# Patient Record
Sex: FEMALE | Race: WHITE | Employment: OTHER | URBAN - METROPOLITAN AREA
[De-identification: names, ages, dates, MRNs, and addresses within clinical notes are randomized per-mention and may not be internally consistent; named-entity substitution may affect disease eponyms.]

---

## 2017-01-13 RX ORDER — ROSUVASTATIN CALCIUM 40 MG/1
40 TABLET, COATED ORAL
Qty: 30 TAB | Refills: 6 | Status: SHIPPED | OUTPATIENT
Start: 2017-01-13 | End: 2017-03-31 | Stop reason: SDUPTHER

## 2017-03-31 RX ORDER — ROSUVASTATIN CALCIUM 40 MG/1
40 TABLET, COATED ORAL
Qty: 90 TAB | Refills: 3 | Status: SHIPPED | OUTPATIENT
Start: 2017-03-31 | End: 2018-07-05 | Stop reason: SDUPTHER

## 2017-05-26 ENCOUNTER — TELEPHONE (OUTPATIENT)
Dept: CARDIOLOGY CLINIC | Age: 57
End: 2017-05-26

## 2017-05-26 NOTE — TELEPHONE ENCOUNTER
Ria Luong from [facility] called states patient is scheduled to have colonoscopy on 6/2/17 for bleeding issues and will need to hold her Plavix prior to procedure. Patient last stented 2008, she was admitted on 10/20/16 for chest pain, cath done at that time was negative per Dr. Leah Galicia note, last 440 [facility] 2014. Patient is scheduled for 1 year follow up with Dr. Gayatri Coates on  6/20/17. iKan Merino     Verbal order and read back per David Marie MD   [id number] Yessenia Mason to hold since no recent stent      -------------------------------------------------------------------------  Ria Socks office made aware verbalized understanding.   Kian Merino

## 2017-07-12 DIAGNOSIS — E78.5 DYSLIPIDEMIA: ICD-10-CM

## 2017-07-12 RX ORDER — CLOPIDOGREL BISULFATE 75 MG/1
75 TABLET ORAL DAILY
Qty: 30 TAB | Refills: 6 | Status: SHIPPED | OUTPATIENT
Start: 2017-07-12 | End: 2018-03-05 | Stop reason: SDUPTHER

## 2017-10-24 ENCOUNTER — OFFICE VISIT (OUTPATIENT)
Dept: CARDIOLOGY CLINIC | Age: 57
End: 2017-10-24

## 2017-10-24 VITALS
HEIGHT: 63 IN | SYSTOLIC BLOOD PRESSURE: 128 MMHG | HEART RATE: 74 BPM | BODY MASS INDEX: 28.17 KG/M2 | WEIGHT: 159 LBS | DIASTOLIC BLOOD PRESSURE: 82 MMHG | OXYGEN SATURATION: 98 %

## 2017-10-24 DIAGNOSIS — E78.5 DYSLIPIDEMIA: ICD-10-CM

## 2017-10-24 DIAGNOSIS — I25.10 CORONARY ARTERY DISEASE INVOLVING NATIVE CORONARY ARTERY OF NATIVE HEART WITHOUT ANGINA PECTORIS: Primary | ICD-10-CM

## 2017-10-24 DIAGNOSIS — I10 ESSENTIAL HYPERTENSION, BENIGN: ICD-10-CM

## 2017-10-24 RX ORDER — NITROGLYCERIN 0.4 MG/1
0.4 TABLET SUBLINGUAL
Qty: 25 TAB | Refills: 3 | Status: SHIPPED | OUTPATIENT
Start: 2017-10-24 | End: 2020-01-14 | Stop reason: SDUPTHER

## 2017-10-24 NOTE — PROGRESS NOTES
HISTORY OF PRESENT ILLNESS  Oral Session is a 55 y.o. female. ASSESSMENT and PLAN    Ms. Hina Melton has history of CAD. She had initial anterolateral MI back in 2001. She had LAD stent performed back in 2008 when she presented with anterior non-STEMI. Because of persistent chest pains, she underwent repeat coronary angiography in 2010 which revealed ostial ramus 90% stenosis which has done well on medical therapy. She also has dyslipidemia and hypertension. With careful dieting, she has maintained her current weight of about 155 pounds.  CAD:   Remains symptomatically stable. She has had occasional episodes of atypical chest pain for which she had been ruled out in the emergency room evaluation.  BP:   Reasonably well-controlled.  HR:    Stable.  CHF:   Currently, there is no evidence of decompensated CHF noted.  Weight:   Her weight today is 159 pounds. Her baseline weight is 155 pounds. I did discuss with her and her  about the benefits of weight loss, hopefully around 10-20 pounds.  Cholesterol:   Target LDL <70. She remains on Crestor 40 mg daily.  Anti-platelet:   Remains on ASA, and Plavix. I will see her back annually. Thank you. Encounter Diagnoses   Name Primary?  Coronary artery disease involving native coronary artery of native heart without angina pectoris Yes    Essential hypertension, benign     Dyslipidemia      current treatment plan is effective, no change in therapy  lab results and schedule of future lab studies reviewed with patient  reviewed diet, exercise and weight control  cardiovascular risk and specific lipid/LDL goals reviewed  use of aspirin to prevent MI and TIA's discussed      HPI  Today, Ms. Mohan Mohamud has no complaints of chest pains, or exertional chest pains. She denies any changes or breathing status or exercise capacity. Occasionally, in the afternoon, after taking care of her 3 toddler grandchildren, she feels fatigue.   She has not had any exertional chest pains. She denies any orthopnea or PND. She denies any palpitations or dizziness. Review of Systems   Constitutional: Positive for malaise/fatigue. Respiratory: Negative for shortness of breath. Cardiovascular: Negative for chest pain, palpitations, orthopnea, claudication, leg swelling and PND. All other systems reviewed and are negative. Physical Exam   Constitutional: She is oriented to person, place, and time. She appears well-developed and well-nourished. HENT:   Head: Normocephalic. Eyes: Conjunctivae are normal.   Neck: Neck supple. No JVD present. Carotid bruit is not present. No thyromegaly present. Cardiovascular: Normal rate and regular rhythm. No murmur heard. Pulmonary/Chest: Breath sounds normal.   Abdominal: Bowel sounds are normal.   Musculoskeletal: She exhibits no edema. Neurological: She is alert and oriented to person, place, and time. Skin: Skin is warm and dry. Nursing note and vitals reviewed. PCP: Bo Tsang MD    Past Medical History:   Diagnosis Date    Abnormal nuclear cardiac imaging test 07/16/2014    Sm-mod apical infarction. No ischemia. EF 60%. Mod apical, apical septal hypk. Neg EKG on max EST. Ex time 6 min.  CAD (coronary artery disease), native coronary artery     Dyslipidemia     Echocardiogram 09/08/2010    EF 50-55%. Mild, diffuse hypk. Mild mid-apical , anterior, apical septal hypk.  Essential hypertension, benign     Headache(784.0)     migraine    History of tobacco use     none since 2001    Hypercholesterolemia     Lower extremity venous duplex 10/05/2011    No evidence of DVT or superficial thrombosis bilaterally.  MI (myocardial infarction) 2001; 3/2008    s/p anterior wall MI, 2001; non-ST elevation MI, 2008    Musculoskeletal pain     Other chest pain     S/P cardiac cath 09/10/2010    RI (sm) 90% ostial.  LAD 30% in-stent restenosis. Otherwise patent coronaries.  LVEDP 19.  EF 60%.  Thyroid disease        Past Surgical History:   Procedure Laterality Date    CARDIAC CATHETERIZATION  10/20/2016         CARDIAC SURG PROCEDURE UNLIST      stent placement     CORONARY ARTERY ANGIOGRAM  10/20/2016         HX APPENDECTOMY      1999    HX CHOLECYSTECTOMY      2005    HX CORONARY STENT PLACEMENT  2001; 2003; 2008    HX GYN      complete hysterectomy 1996    LV ANGIOGRAPHY  10/20/2016            Current Outpatient Prescriptions   Medication Sig Dispense Refill    nitroglycerin (NITROSTAT) 0.4 mg SL tablet 1 Tab by SubLINGual route every five (5) minutes as needed for Chest Pain. 25 Tab 3    clopidogrel (PLAVIX) 75 mg tab Take 1 Tab by mouth daily. 30 Tab 6    rosuvastatin (CRESTOR) 40 mg tablet Take 1 Tab by mouth nightly. 90 Tab 3    famotidine (PEPCID) 40 mg tablet Take 1 Tab by mouth daily. 20 Tab 0    HYDROmorphone (DILAUDID) 2 mg tablet Take 1 Tab by mouth every four (4) hours as needed. Max Daily Amount: 12 mg. 20 Tab 0    aspirin delayed-release 81 mg tablet Take 1 Tab by mouth daily. 1 Tab 0    levothyroxine (SYNTHROID) 75 mcg tablet Take 75 mcg by mouth Daily (before breakfast).  butalbital-acetaminophen-caffeine -40 mg lollipop 400 mcg by Buccal route three (3) times daily.  BUTORPHANOL TARTRATE IJ by Injection route as needed. 10 mg/ml QD      zolpidem (AMBIEN) 10 mg tablet Take 10 mg by mouth nightly as needed.  lisinopril (PRINIVIL, ZESTRIL) 10 mg tablet Take 10 mg by mouth daily.          The patient has a family history of    Social History   Substance Use Topics    Smoking status: Former Smoker     Packs/day: 1.00     Years: 10.00     Quit date: 10/5/2000    Smokeless tobacco: Never Used    Alcohol use 0.6 oz/week     1 Glasses of wine per week      Comment: occasional alcohol use       Lab Results   Component Value Date/Time    Cholesterol, total 146 10/06/2011 01:15 AM    HDL Cholesterol 64 10/06/2011 01:15 AM    LDL, calculated 58.4 10/06/2011 01:15 AM    Triglyceride 118 10/06/2011 01:15 AM    CHOL/HDL Ratio 2.3 10/06/2011 01:15 AM        BP Readings from Last 3 Encounters:   10/24/17 128/82   10/20/16 130/77   06/20/16 126/78        Pulse Readings from Last 3 Encounters:   10/24/17 74   10/20/16 71   06/20/16 78       Wt Readings from Last 3 Encounters:   10/24/17 72.1 kg (159 lb)   10/20/16 68.9 kg (152 lb)   06/20/16 71.7 kg (158 lb)         EKG: unchanged from previous tracings, normal sinus rhythm, low precordial R-wave voltage.

## 2017-10-24 NOTE — PROGRESS NOTES
1. Have you been to the ER, urgent care clinic since your last visit? Hospitalized since your last visit? Yes, 10/20/16 - 10/21/16 for chest pain     2. Have you seen or consulted any other health care providers outside of the [address] [place] [address] since your last visit? Include any pap smears or colon screening.   No

## 2017-10-24 NOTE — MR AVS SNAPSHOT
Visit Information Date & Time Provider Department Dept. Phone Encounter #  
 10/24/2017  4:00 PM Avril Lo MD [facility] [address] [id number] Upcoming Health Maintenance Date Due Hepatitis C Screening 1960 DTaP/Tdap/Td series (1 - Tdap) 12/29/1982 PAP AKA CERVICAL CYTOLOGY 12/29/1982 FOBT Q 1 YEAR AGE 50-75 12/29/2011 INFLUENZA AGE 9 TO ADULT 8/1/2017 BREAST CANCER SCRN MAMMOGRAM 2/11/2019 Allergies as of 10/24/2017  Review Complete On: 10/20/2016 By: Stella Crocker RN Severity Noted Reaction Type Reactions Nitroglycerin    Other (comments) Could not tolerate long-acting nitroglycerin due to her migraine headaches. Toprol Xl [Metoprolol Succinate]    Other (comments) Could not tolerate b/c of her migraine headaches Current Immunizations  Never Reviewed No immunizations on file. Not reviewed this visit You Were Diagnosed With   
  
 Codes Comments Coronary artery disease involving native coronary artery of native heart without angina pectoris    -  Primary ICD-10-CM: I25.10 ICD-9-CM: 414.01 Essential hypertension, benign     ICD-10-CM: I10 
ICD-9-CM: 401.1 Dyslipidemia     ICD-10-CM: E78.5 ICD-9-CM: 272.4 Vitals BP Pulse Height(growth percentile) Weight(growth percentile) SpO2 BMI  
 128/82 74 5' 3\" (1.6 m) 159 lb (72.1 kg) 98% 28.17 kg/m2 Smoking Status Former Smoker Vitals History BMI and BSA Data Body Mass Index Body Surface Area  
 28.17 kg/m 2 1.79 m 2 Preferred Pharmacy Pharmacy Name Phone [facility] [phone], [address] [address] [phone] Your Updated Medication List  
  
   
This list is accurate as of: 10/24/17  4:25 PM.  Always use your most recent med list.  
  
  
  
  
 aspirin delayed-release 81 mg tablet Take 1 Tab by mouth daily. butalbital-acetaminophen-caffeine -40 mg lollipop 400 mcg by Buccal route three (3) times daily. BUTORPHANOL TARTRATE INJECTION  
by Injection route as needed. 10 mg/ml QD  
  
 clopidogrel 75 mg Tab Commonly known as:  PLAVIX Take 1 Tab by mouth daily. famotidine 40 mg tablet Commonly known as:  PEPCID Take 1 Tab by mouth daily. HYDROmorphone 2 mg tablet Commonly known as:  DILAUDID Take 1 Tab by mouth every four (4) hours as needed. Max Daily Amount: 12 mg.  
  
 levothyroxine 75 mcg tablet Commonly known as:  SYNTHROID Take 75 mcg by mouth Daily (before breakfast). lisinopril 10 mg tablet Commonly known as:  Aundra Raul Take 10 mg by mouth daily. nitroglycerin 0.4 mg SL tablet Commonly known as:  NITROSTAT  
1 Tab by SubLINGual route every five (5) minutes as needed for Chest Pain. rosuvastatin 40 mg tablet Commonly known as:  CRESTOR Take 1 Tab by mouth nightly. zolpidem 10 mg tablet Commonly known as:  AMBIEN Take 10 mg by mouth nightly as needed. We Performed the Following AMB POC EKG ROUTINE W/ 12 LEADS, INTER & REP [87033 CPT(R)] Introducing [facility] SERVICES! Dear Soledad Leach: Thank you for requesting a [employer] account. Our records indicate that you have previously registered for a [employer] account but its currently inactive. Please call our S[employer] support line at [phone]. Additional Information If you have questions, please visit the Frequently Asked Questions section of the [employer] website at [email or web address] Remember, [employer] is NOT to be used for urgent needs. For medical emergencies, dial 911. Now available from your iPhone and Android! Please provide this summary of care documentation to your next provider. Your primary care clinician is listed as Olena Fisher.  If you have any questions after today's visit, please call [phone].

## 2018-03-05 DIAGNOSIS — E78.5 DYSLIPIDEMIA: ICD-10-CM

## 2018-03-05 RX ORDER — CLOPIDOGREL BISULFATE 75 MG/1
75 TABLET ORAL DAILY
Qty: 30 TAB | Refills: 6 | Status: SHIPPED | OUTPATIENT
Start: 2018-03-05 | End: 2018-11-16 | Stop reason: SDUPTHER

## 2018-07-05 RX ORDER — ROSUVASTATIN CALCIUM 40 MG/1
40 TABLET, COATED ORAL
Qty: 90 TAB | Refills: 3 | Status: SHIPPED | OUTPATIENT
Start: 2018-07-05 | End: 2019-08-05 | Stop reason: SDUPTHER

## 2018-11-16 DIAGNOSIS — E78.5 DYSLIPIDEMIA: ICD-10-CM

## 2018-11-16 RX ORDER — CLOPIDOGREL BISULFATE 75 MG/1
75 TABLET ORAL DAILY
Qty: 90 TAB | Refills: 3 | Status: SHIPPED | OUTPATIENT
Start: 2018-11-16 | End: 2019-12-10 | Stop reason: SDUPTHER

## 2019-02-12 ENCOUNTER — OFFICE VISIT (OUTPATIENT)
Dept: CARDIOLOGY CLINIC | Age: 59
End: 2019-02-12

## 2019-02-12 VITALS
DIASTOLIC BLOOD PRESSURE: 68 MMHG | HEIGHT: 63 IN | BODY MASS INDEX: 28.88 KG/M2 | WEIGHT: 163 LBS | OXYGEN SATURATION: 98 % | SYSTOLIC BLOOD PRESSURE: 130 MMHG | HEART RATE: 74 BPM

## 2019-02-12 DIAGNOSIS — R07.9 CHEST PAIN, UNSPECIFIED TYPE: ICD-10-CM

## 2019-02-12 DIAGNOSIS — I25.10 CORONARY ARTERY DISEASE INVOLVING NATIVE CORONARY ARTERY OF NATIVE HEART WITHOUT ANGINA PECTORIS: Primary | ICD-10-CM

## 2019-02-12 NOTE — PROGRESS NOTES
HISTORY OF PRESENT ILLNESS  Ugo Sampson is a 63 y.o. female. ASSESSMENT and PLAN    Ms. Kendra Whitlock has history of CAD. She had initial anterolateral MI back in 2001. She had LAD stent performed back in 2008 when she presented with anterior non-STEMI. Because of persistent chest pains, she underwent repeat coronary angiography in 2010 which revealed ostial ramus 90% stenosis which has done well on medical therapy. She also has dyslipidemia and hypertension. With careful dieting, she is trying to maintain her weight at about 155 pounds.  CAD:   Likely stable.  BP:   Mildly elevated. Again, encouraged weight control.  HR:    Stable.  CHF:   There is no evidence of decompensated CHF noted.  Weight:   Her weight today is 163 pounds. Her baseline weight is 155 pounds. As noted above, I did encourage her to try to go back to 155 pounds. I feel that her blood pressure will be better controlled with weight loss.  Cholesterol:   Target LDL <70. Crestor 40.  Anti-platelet:   Remains on ASA, and Plavix. I will see her back annually. Thank you. Encounter Diagnoses   Name Primary?  Coronary artery disease involving native coronary artery of native heart without angina pectoris Yes    Chest pain, unspecified type      current treatment plan is effective, no change in therapy  lab results and schedule of future lab studies reviewed with patient  reviewed diet, exercise and weight control  cardiovascular risk and specific lipid/LDL goals reviewed  use of aspirin to prevent MI and TIA's discussed        HPI  Today, Ms. Min Lawrence has no complaints of chest pains, or increased dyspnea on exertion. She denies any changes in her exercise capacity. She takes care of 4 grandchildren and remains active by doing this. She denies any significant limitations. She denies any recent changes. She denies any orthopnea or PND. She denies any palpitations or dizziness.   She is accompanied by her . Review of Systems   Respiratory: Negative for shortness of breath. Cardiovascular: Negative for chest pain, palpitations, orthopnea, claudication, leg swelling and PND. All other systems reviewed and are negative. Physical Exam   Constitutional: She is oriented to person, place, and time. She appears well-developed and well-nourished. HENT:   Head: Normocephalic. Eyes: Conjunctivae are normal.   Neck: Neck supple. No JVD present. Carotid bruit is not present. No thyromegaly present. Cardiovascular: Normal rate and regular rhythm. Pulmonary/Chest: Breath sounds normal.   Abdominal: Bowel sounds are normal.   Musculoskeletal: She exhibits no edema. Neurological: She is alert and oriented to person, place, and time. Skin: Skin is warm and dry. Nursing note and vitals reviewed. PCP: Lory Ramos MD    Past Medical History:   Diagnosis Date    Abnormal nuclear cardiac imaging test 07/16/2014    Sm-mod apical infarction. No ischemia. EF 60%. Mod apical, apical septal hypk. Neg EKG on max EST. Ex time 6 min.  CAD (coronary artery disease), native coronary artery     Dyslipidemia     Echocardiogram 09/08/2010    EF 50-55%. Mild, diffuse hypk. Mild mid-apical , anterior, apical septal hypk.  Essential hypertension, benign     Headache(784.0)     migraine    History of tobacco use     none since 2001    Hypercholesterolemia     Lower extremity venous duplex 10/05/2011    No evidence of DVT or superficial thrombosis bilaterally.  MI (myocardial infarction) ([facility] [address] 2001; 3/2008    s/p anterior wall MI, 2001; non-ST elevation MI, 2008    Musculoskeletal pain     Other chest pain     S/P cardiac cath 09/10/2010    RI (sm) 90% ostial.  LAD 30% in-stent restenosis. Otherwise patent coronaries. LVEDP 19. EF 60%.       Thyroid disease        Past Surgical History:   Procedure Laterality Date    CARDIAC CATHETERIZATION  10/20/2016        [facility] CARDIAC SURG PROCEDURE UNLIST      stent placement     CORONARY ARTERY ANGIOGRAM  10/20/2016         HX APPENDECTOMY          HX CHOLECYSTECTOMY      2005    HX CORONARY STENT PLACEMENT  ; ;     HX GYN      complete hysterectomy     LV ANGIOGRAPHY  10/20/2016            Current Outpatient Medications   Medication Sig Dispense Refill    clopidogrel (PLAVIX) 75 mg tab Take 1 Tab by mouth daily. 90 Tab 3    rosuvastatin (CRESTOR) 40 mg tablet Take 1 Tab by mouth nightly. 90 Tab 3    nitroglycerin (NITROSTAT) 0.4 mg SL tablet 1 Tab by SubLINGual route every five (5) minutes as needed for Chest Pain. 25 Tab 3    famotidine (PEPCID) 40 mg tablet Take 1 Tab by mouth daily. 20 Tab 0    HYDROmorphone (DILAUDID) 2 mg tablet Take 1 Tab by mouth every four (4) hours as needed. Max Daily Amount: 12 mg. 20 Tab 0    aspirin delayed-release 81 mg tablet Take 1 Tab by mouth daily. 1 Tab 0    levothyroxine (SYNTHROID) 75 mcg tablet Take 75 mcg by mouth Daily (before breakfast).  butalbital-acetaminophen-caffeine -40 mg lollipop 400 mcg by Buccal route three (3) times daily.  BUTORPHANOL TARTRATE IJ by Injection route as needed. 10 mg/ml QD      zolpidem (AMBIEN) 10 mg tablet Take 10 mg by mouth nightly as needed.  lisinopril (PRINIVIL, ZESTRIL) 10 mg tablet Take 10 mg by mouth daily. The patient has a family history of    Social History     Tobacco Use    Smoking status: Former Smoker     Packs/day: 1.00     Years: 10.00     Pack years: 10.00     Last attempt to quit: 10/5/2000     Years since quittin.3    Smokeless tobacco: Never Used   Substance Use Topics    Alcohol use:  Yes     Alcohol/week: 0.6 oz     Types: 1 Glasses of wine per week     Comment: occasional alcohol use    Drug use: No       Lab Results   Component Value Date/Time    Cholesterol, total 146 10/06/2011 01:15 AM    HDL Cholesterol 64 (H) 10/06/2011 01:15 AM    LDL, calculated 58.4 10/06/2011 01:15 AM Triglyceride 118 10/06/2011 01:15 AM    CHOL/HDL Ratio 2.3 10/06/2011 01:15 AM        BP Readings from Last 3 Encounters:   02/12/19 130/68   10/24/17 128/82   10/20/16 130/77        Pulse Readings from Last 3 Encounters:   02/12/19 74   10/24/17 74   10/20/16 71       Wt Readings from Last 3 Encounters:   02/12/19 73.9 kg (163 lb)   10/24/17 72.1 kg (159 lb)   10/20/16 68.9 kg (152 lb)         EKG: unchanged from previous tracings, Q waves in V1 through V3 with low precordial R wave voltage.

## 2019-03-15 ENCOUNTER — TELEPHONE (OUTPATIENT)
Dept: CARDIOLOGY CLINIC | Age: 59
End: 2019-03-15

## 2019-03-15 NOTE — TELEPHONE ENCOUNTER
Patient called regarding a clearance she needs. 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 Isabella Okeefe and needs to come off her Plavix x5 days. Patient states that she didn't take her Plavix today. Verbal order and read back per Oziel Peguero MD  Patient is cleared and low risk for tumor removal and can come off Plavix for 5 days.

## 2019-08-05 RX ORDER — ROSUVASTATIN CALCIUM 40 MG/1
40 TABLET, COATED ORAL
Qty: 90 TAB | Refills: 3 | Status: SHIPPED | OUTPATIENT
Start: 2019-08-05 | End: 2020-08-05

## 2019-12-10 DIAGNOSIS — E78.5 DYSLIPIDEMIA: ICD-10-CM

## 2019-12-10 RX ORDER — CLOPIDOGREL BISULFATE 75 MG/1
75 TABLET ORAL DAILY
Qty: 90 TAB | Refills: 3 | Status: SHIPPED | OUTPATIENT
Start: 2019-12-10 | End: 2020-12-09 | Stop reason: SDUPTHER

## 2020-01-11 ENCOUNTER — DOCUMENTATION ONLY (OUTPATIENT)
Dept: CARDIOLOGY CLINIC | Age: 60
End: 2020-01-11

## 2020-01-11 ENCOUNTER — TELEPHONE (OUTPATIENT)
Dept: CARDIOLOGY CLINIC | Age: 60
End: 2020-01-11

## 2020-01-11 ENCOUNTER — HOSPITAL ENCOUNTER (EMERGENCY)
Dept: CT IMAGING | Age: 60
Discharge: HOME | End: 2020-01-11
Attending: EMERGENCY MEDICINE
Payer: MEDICARE

## 2020-01-11 ENCOUNTER — HOSPITAL ENCOUNTER (EMERGENCY)
Age: 60
Discharge: HOME | End: 2020-01-11
Attending: EMERGENCY MEDICINE
Payer: MEDICARE

## 2020-01-11 ENCOUNTER — APPOINTMENT (OUTPATIENT)
Dept: GENERAL RADIOLOGY | Age: 60
End: 2020-01-11
Attending: EMERGENCY MEDICINE
Payer: MEDICARE

## 2020-01-11 VITALS
BODY MASS INDEX: 27.55 KG/M2 | SYSTOLIC BLOOD PRESSURE: 144 MMHG | OXYGEN SATURATION: 97 % | DIASTOLIC BLOOD PRESSURE: 82 MMHG | HEART RATE: 94 BPM | WEIGHT: 161.4 LBS | TEMPERATURE: 97 F | HEIGHT: 64 IN | RESPIRATION RATE: 14 BRPM

## 2020-01-11 DIAGNOSIS — Z95.5 HISTORY OF CORONARY ARTERY STENT PLACEMENT: ICD-10-CM

## 2020-01-11 DIAGNOSIS — Z86.79 HISTORY OF CHRONIC HYPERTENSION: ICD-10-CM

## 2020-01-11 DIAGNOSIS — Z86.79 HISTORY OF CORONARY ARTERY DISEASE: ICD-10-CM

## 2020-01-11 DIAGNOSIS — R07.89 ATYPICAL CHEST PAIN: Primary | ICD-10-CM

## 2020-01-11 LAB
ALBUMIN SERPL-MCNC: 3.9 G/DL (ref 3.4–5)
ALBUMIN/GLOB SERPL: 1.1 {RATIO} (ref 0.8–1.7)
ALP SERPL-CCNC: 142 U/L (ref 45–117)
ALT SERPL-CCNC: 42 U/L (ref 13–56)
ANION GAP SERPL CALC-SCNC: 4 MMOL/L (ref 3–18)
AST SERPL-CCNC: 29 U/L (ref 10–38)
BASOPHILS # BLD: 0 K/UL (ref 0–0.1)
BASOPHILS NFR BLD: 0 % (ref 0–2)
BILIRUB SERPL-MCNC: 0.2 MG/DL (ref 0.2–1)
BNP SERPL-MCNC: 124 PG/ML (ref 0–900)
BUN SERPL-MCNC: 18 MG/DL (ref 7–18)
BUN/CREAT SERPL: 22 (ref 12–20)
CALCIUM SERPL-MCNC: 9.3 MG/DL (ref 8.5–10.1)
CHLORIDE SERPL-SCNC: 109 MMOL/L (ref 100–111)
CK MB CFR SERPL CALC: NORMAL % (ref 0–4)
CK MB CFR SERPL CALC: NORMAL % (ref 0–4)
CK MB SERPL-MCNC: <1 NG/ML (ref 5–25)
CK MB SERPL-MCNC: <1 NG/ML (ref 5–25)
CK SERPL-CCNC: 64 U/L (ref 26–192)
CK SERPL-CCNC: 79 U/L (ref 26–192)
CO2 SERPL-SCNC: 27 MMOL/L (ref 21–32)
CREAT SERPL-MCNC: 0.83 MG/DL (ref 0.6–1.3)
DIFFERENTIAL METHOD BLD: NORMAL
EOSINOPHIL # BLD: 0.2 K/UL (ref 0–0.4)
EOSINOPHIL NFR BLD: 3 % (ref 0–5)
ERYTHROCYTE [DISTWIDTH] IN BLOOD BY AUTOMATED COUNT: 13.2 % (ref 11.6–14.5)
GLOBULIN SER CALC-MCNC: 3.5 G/DL (ref 2–4)
GLUCOSE SERPL-MCNC: 107 MG/DL (ref 74–99)
HCT VFR BLD AUTO: 40.1 % (ref 35–45)
HGB BLD-MCNC: 13.2 G/DL (ref 12–16)
LYMPHOCYTES # BLD: 2 K/UL (ref 0.9–3.6)
LYMPHOCYTES NFR BLD: 24 % (ref 21–52)
MCH RBC QN AUTO: 29.5 PG (ref 24–34)
MCHC RBC AUTO-ENTMCNC: 32.9 G/DL (ref 31–37)
MCV RBC AUTO: 89.7 FL (ref 74–97)
MONOCYTES # BLD: 0.8 K/UL (ref 0.05–1.2)
MONOCYTES NFR BLD: 10 % (ref 3–10)
NEUTS SEG # BLD: 5.5 K/UL (ref 1.8–8)
NEUTS SEG NFR BLD: 63 % (ref 40–73)
PLATELET # BLD AUTO: 261 K/UL (ref 135–420)
PMV BLD AUTO: 10.4 FL (ref 9.2–11.8)
POTASSIUM SERPL-SCNC: 4.3 MMOL/L (ref 3.5–5.5)
PROT SERPL-MCNC: 7.4 G/DL (ref 6.4–8.2)
RBC # BLD AUTO: 4.47 M/UL (ref 4.2–5.3)
SODIUM SERPL-SCNC: 140 MMOL/L (ref 136–145)
TROPONIN I SERPL-MCNC: <0.02 NG/ML (ref 0–0.04)
WBC # BLD AUTO: 8.5 K/UL (ref 4.6–13.2)

## 2020-01-11 PROCEDURE — 96374 THER/PROPH/DIAG INJ IV PUSH: CPT

## 2020-01-11 PROCEDURE — 82550 ASSAY OF CK (CPK): CPT

## 2020-01-11 PROCEDURE — 74174 CTA ABD&PLVS W/CONTRAST: CPT

## 2020-01-11 PROCEDURE — 85025 COMPLETE CBC W/AUTO DIFF WBC: CPT

## 2020-01-11 PROCEDURE — 74011250637 HC RX REV CODE- 250/637: Performed by: STUDENT IN AN ORGANIZED HEALTH CARE EDUCATION/TRAINING PROGRAM

## 2020-01-11 PROCEDURE — 71045 X-RAY EXAM CHEST 1 VIEW: CPT

## 2020-01-11 PROCEDURE — 74011250636 HC RX REV CODE- 250/636: Performed by: STUDENT IN AN ORGANIZED HEALTH CARE EDUCATION/TRAINING PROGRAM

## 2020-01-11 PROCEDURE — 80053 COMPREHEN METABOLIC PANEL: CPT

## 2020-01-11 PROCEDURE — 74011636320 HC RX REV CODE- 636/320: Performed by: EMERGENCY MEDICINE

## 2020-01-11 PROCEDURE — 93005 ELECTROCARDIOGRAM TRACING: CPT

## 2020-01-11 PROCEDURE — 74011250636 HC RX REV CODE- 250/636: Performed by: EMERGENCY MEDICINE

## 2020-01-11 PROCEDURE — 83880 ASSAY OF NATRIURETIC PEPTIDE: CPT

## 2020-01-11 PROCEDURE — 99284 EMERGENCY DEPT VISIT MOD MDM: CPT

## 2020-01-11 PROCEDURE — 96375 TX/PRO/DX INJ NEW DRUG ADDON: CPT

## 2020-01-11 RX ORDER — GUAIFENESIN 100 MG/5ML
LIQUID (ML) ORAL
Status: DISPENSED
Start: 2020-01-11 | End: 2020-01-12

## 2020-01-11 RX ORDER — FAMOTIDINE 10 MG/ML
INJECTION INTRAVENOUS
Status: DISPENSED
Start: 2020-01-11 | End: 2020-01-12

## 2020-01-11 RX ORDER — GUAIFENESIN 100 MG/5ML
81 LIQUID (ML) ORAL
Status: COMPLETED | OUTPATIENT
Start: 2020-01-11 | End: 2020-01-11

## 2020-01-11 RX ORDER — MORPHINE SULFATE 4 MG/ML
INJECTION INTRAVENOUS
Status: DISCONTINUED
Start: 2020-01-11 | End: 2020-01-11 | Stop reason: HOSPADM

## 2020-01-11 RX ORDER — FAMOTIDINE 10 MG/ML
20 INJECTION INTRAVENOUS
Status: DISCONTINUED | OUTPATIENT
Start: 2020-01-11 | End: 2020-01-11

## 2020-01-11 RX ORDER — NITROGLYCERIN 0.4 MG/1
TABLET SUBLINGUAL
Status: DISCONTINUED
Start: 2020-01-11 | End: 2020-01-12 | Stop reason: WASHOUT

## 2020-01-11 RX ORDER — ACETAMINOPHEN 325 MG/1
TABLET ORAL
Status: DISPENSED
Start: 2020-01-11 | End: 2020-01-12

## 2020-01-11 RX ORDER — FENTANYL CITRATE 50 UG/ML
INJECTION, SOLUTION INTRAMUSCULAR; INTRAVENOUS
Status: DISCONTINUED
Start: 2020-01-11 | End: 2020-01-11 | Stop reason: HOSPADM

## 2020-01-11 RX ORDER — FAMOTIDINE 10 MG/ML
20 INJECTION INTRAVENOUS
Status: COMPLETED | OUTPATIENT
Start: 2020-01-11 | End: 2020-01-11

## 2020-01-11 RX ORDER — NITROGLYCERIN 0.4 MG/1
0.4 TABLET SUBLINGUAL
Status: COMPLETED | OUTPATIENT
Start: 2020-01-11 | End: 2020-01-11

## 2020-01-11 RX ORDER — FENTANYL CITRATE 50 UG/ML
50 INJECTION, SOLUTION INTRAMUSCULAR; INTRAVENOUS
Status: COMPLETED | OUTPATIENT
Start: 2020-01-11 | End: 2020-01-11

## 2020-01-11 RX ORDER — ACETAMINOPHEN 325 MG/1
975 TABLET ORAL ONCE
Status: COMPLETED | OUTPATIENT
Start: 2020-01-11 | End: 2020-01-11

## 2020-01-11 RX ORDER — ONDANSETRON 2 MG/ML
4 INJECTION INTRAMUSCULAR; INTRAVENOUS
Status: COMPLETED | OUTPATIENT
Start: 2020-01-11 | End: 2020-01-11

## 2020-01-11 RX ORDER — MORPHINE SULFATE 2 MG/ML
4 INJECTION, SOLUTION INTRAMUSCULAR; INTRAVENOUS ONCE
Status: COMPLETED | OUTPATIENT
Start: 2020-01-11 | End: 2020-01-11

## 2020-01-11 RX ORDER — ONDANSETRON 2 MG/ML
INJECTION INTRAMUSCULAR; INTRAVENOUS
Status: DISCONTINUED
Start: 2020-01-11 | End: 2020-01-11 | Stop reason: HOSPADM

## 2020-01-11 RX ADMIN — NITROGLYCERIN 0.4 MG: 0.4 TABLET SUBLINGUAL at 12:31

## 2020-01-11 RX ADMIN — MORPHINE SULFATE 4 MG: 2 INJECTION, SOLUTION INTRAMUSCULAR; INTRAVENOUS at 13:08

## 2020-01-11 RX ADMIN — ASPIRIN 81 MG 81 MG: 81 TABLET ORAL at 12:24

## 2020-01-11 RX ADMIN — IOPAMIDOL 100 ML: 755 INJECTION, SOLUTION INTRAVENOUS at 14:33

## 2020-01-11 RX ADMIN — ONDANSETRON 4 MG: 2 INJECTION INTRAMUSCULAR; INTRAVENOUS at 13:25

## 2020-01-11 RX ADMIN — NITROGLYCERIN 0.4 MG: 0.4 TABLET SUBLINGUAL at 12:36

## 2020-01-11 RX ADMIN — ACETAMINOPHEN 975 MG: 325 TABLET ORAL at 12:24

## 2020-01-11 RX ADMIN — NITROGLYCERIN 0.4 MG: 0.4 TABLET SUBLINGUAL at 12:25

## 2020-01-11 RX ADMIN — FENTANYL CITRATE 50 MCG: 50 INJECTION, SOLUTION INTRAMUSCULAR; INTRAVENOUS at 15:29

## 2020-01-11 RX ADMIN — FAMOTIDINE 20 MG: 10 INJECTION, SOLUTION INTRAVENOUS at 14:39

## 2020-01-11 NOTE — PROGRESS NOTES
I saw this patient in the emergency room on the afternoon of January 11, 2019 I did talk to the patient and examined her as well as reviewing her hospital records. She unfortunately was discharged prior to my full consultation dictation which is the reason I am making this notation. When I saw her she was complaining of severe substernal chest discomfort which was rated 6 or 7/10 in severity. This discomfort had been constant for more than 24 hours and was initially in the 5/10 range. She had serial EKGs which demonstrated nothing acute showing an old inferior MI and old anteroseptal MI, but no ST changes. She had 3 troponins which were less than 0.02 and she had a CT of the chest to rule out aortic dissection with a findings as indicated below:    1. No dissection, intramural hematoma or pulmonary embolus. 2. Focal outpouching at the expected previous location of ductus arteriosus. 3. Cholecystectomy, hysterectomy and appendectomy. 4. Atherosclerosis. There is some narrowing of the renal artery origins bilaterally, left greater than right. 5. Status post LAD stent placement with left ventricular findings of previous myocardial ischemia. 6. Please see report of multiple additional findings and further details. She was treated for gastroesophageal reflux disease per the ER physician Dr. Neal Duong and was feeling somewhat better though was still having chest discomfort, but insisted on being discharged home. I suspect she will call for an appointment with Dr. Jovan Merlos on Monday and wanted to document her ER visit and that I had seen her since she left before I could complete the consultation.  ES

## 2020-01-11 NOTE — TELEPHONE ENCOUNTER
Patient called to tell me that she has been having chest pain on and off for 2 weeks and she has had constant chest pain since yesterday. It is not terribly severe but she would rated 5/10 in severity. There is a mild pleuritic accentuation and some accentuation with body movement so this may be noncardiac,. However, this lady has known coronary artery disease and so I recommended that she go to an emergency room to get an EKG and cardiac enzymes to be sure she is not having an acute coronary event. Since the pain is been constant since last night and has some pleuritic component and accentuation with body movement I think it is reasonable to go by car. She will either go to [facility] emergency room which she is closest to since she lives in [facility] or possibly come [facility] for an EKG and cardiac enzymes.  ES

## 2020-01-11 NOTE — ED PROVIDER NOTES
EMERGENCY DEPARTMENT HISTORY AND PHYSICAL EXAM    12:25 PM      Date: 1/11/2020  Patient Name: Baldemar Mcclain    History of Presenting Illness     Chief Complaint   Patient presents with    Chest Pain         History Provided By: Patient  Location/Duration/Severity/Modifying factors   Baldemar Mcclain is a 63 y.o. female with a history of coronary artery disease with stents x7, hypertension, and hypercholesterolemia presenting for chest pain that was constant since yesterday afternoon. \"Feels like her prior heart attack\". Took 81 mg aspirin x3 prior to arrival.  Reports that she was having intermittent pain for several days prior, but pain became constant and more severe yesterday. She denies any exertional symptoms. No associated shortness of breath or diaphoresis. Mild nausea, but no vomiting or diarrhea. No abdominal pain. Patient reports compliance with her medications. PCP: Leticia Weston MD    Current Facility-Administered Medications   Medication Dose Route Frequency Provider Last Rate Last Dose    morphine 4 mg/mL injection              Current Outpatient Medications   Medication Sig Dispense Refill    clopidogrel (PLAVIX) 75 mg tab Take 1 Tab by mouth daily. 90 Tab 3    rosuvastatin (CRESTOR) 40 mg tablet Take 1 Tab by mouth nightly. 90 Tab 3    nitroglycerin (NITROSTAT) 0.4 mg SL tablet 1 Tab by SubLINGual route every five (5) minutes as needed for Chest Pain. 25 Tab 3    famotidine (PEPCID) 40 mg tablet Take 1 Tab by mouth daily. 20 Tab 0    HYDROmorphone (DILAUDID) 2 mg tablet Take 1 Tab by mouth every four (4) hours as needed. Max Daily Amount: 12 mg. 20 Tab 0    aspirin delayed-release 81 mg tablet Take 1 Tab by mouth daily. 1 Tab 0    levothyroxine (SYNTHROID) 75 mcg tablet Take 75 mcg by mouth Daily (before breakfast).  butalbital-acetaminophen-caffeine -40 mg lollipop 400 mcg by Buccal route three (3) times daily.       BUTORPHANOL TARTRATE IJ by Injection route as needed. 10 mg/ml QD      zolpidem (AMBIEN) 10 mg tablet Take 10 mg by mouth nightly as needed.  lisinopril (PRINIVIL, ZESTRIL) 10 mg tablet Take 10 mg by mouth daily. Past History     Past Medical History:  Past Medical History:   Diagnosis Date    Abnormal nuclear cardiac imaging test 07/16/2014    Sm-mod apical infarction. No ischemia. EF 60%. Mod apical, apical septal hypk. Neg EKG on max EST. Ex time 6 min.  CAD (coronary artery disease), native coronary artery     Dyslipidemia     Echocardiogram 09/08/2010    EF 50-55%. Mild, diffuse hypk. Mild mid-apical , anterior, apical septal hypk.  Essential hypertension, benign     Headache(784.0)     migraine    History of tobacco use     none since 2001    Hypercholesterolemia     Lower extremity venous duplex 10/05/2011    No evidence of DVT or superficial thrombosis bilaterally.  MI (myocardial infarction) ([facility] [address] 2001; 3/2008    s/p anterior wall MI, 2001; non-ST elevation MI, 2008    Musculoskeletal pain     Other chest pain     S/P cardiac cath 09/10/2010    RI (sm) 90% ostial.  LAD 30% in-stent restenosis. Otherwise patent coronaries. LVEDP 19. EF 60%.       Thyroid disease        Past Surgical History:  Past Surgical History:   Procedure Laterality Date    CARDIAC CATHETERIZATION  10/20/2016         CARDIAC SURG PROCEDURE UNLIST      stent placement     CORONARY ARTERY ANGIOGRAM  10/20/2016         HX APPENDECTOMY      1999    HX CHOLECYSTECTOMY      2005    HX CORONARY STENT PLACEMENT  2001; 2003; 2008    HX GYN      complete hysterectomy 1996    LV ANGIOGRAPHY  10/20/2016            Family History:  Family History   Problem Relation Age of Onset    Heart Disease Father     Heart Disease Maternal Grandfather        Social History:  Social History     Tobacco Use    Smoking status: Former Smoker     Packs/day: 1.00     Years: 10.00     Pack years: 10.00     Last attempt to quit: 10/5/2000     Years since quittin.2    Smokeless tobacco: Never Used   Substance Use Topics    Alcohol use: Yes     Alcohol/week: 1.0 standard drinks     Types: 1 Glasses of wine per week     Comment: occasional alcohol use    Drug use: No       Allergies: Allergies   Allergen Reactions    Nitroglycerin Other (comments)     Could not tolerate long-acting nitroglycerin due to her migraine headaches.  Toprol Xl [Metoprolol Succinate] Other (comments)     Could not tolerate b/c of her migraine headaches         Review of Systems       Review of Systems   Constitutional: Negative for chills, diaphoresis and fever. Respiratory: Positive for cough. Negative for shortness of breath. Cardiovascular: Positive for chest pain. Gastrointestinal: Positive for nausea. Negative for abdominal pain, diarrhea and vomiting. Endocrine: Negative. Genitourinary: Negative. Neurological: Negative. Psychiatric/Behavioral: Negative. All other systems reviewed and are negative. Physical Exam     Visit Vitals  /82   Pulse 94   Temp 97 °F (36.1 °C)   Resp 14   Ht 5' 3.5\" (1.613 m)   Wt 73.2 kg (161 lb 6.4 oz)   SpO2 97%   BMI 28.14 kg/m²         Physical Exam  Vitals signs and nursing note reviewed. Constitutional:       General: She is not in acute distress. Appearance: She is well-developed. She is not diaphoretic. HENT:      Head: Normocephalic. Eyes:      Extraocular Movements: Extraocular movements intact. Pupils: Pupils are equal, round, and reactive to light. Neck:      Musculoskeletal: Normal range of motion. Cardiovascular:      Rate and Rhythm: Normal rate and regular rhythm. Heart sounds: Normal heart sounds. Pulmonary:      Effort: Pulmonary effort is normal. No respiratory distress. Breath sounds: Normal breath sounds. Chest:      Chest wall: No tenderness or crepitus. Abdominal:      Palpations: Abdomen is soft. Tenderness: There is no tenderness. Musculoskeletal: Normal range of motion. Right lower leg: No edema. Left lower leg: No edema. Skin:     General: Skin is warm and dry. Capillary Refill: Capillary refill takes less than 2 seconds. Neurological:      General: No focal deficit present. Mental Status: She is alert and oriented to person, place, and time. Psychiatric:         Mood and Affect: Mood normal.         Behavior: Behavior normal.           Diagnostic Study Results     Labs -  Recent Results (from the past 12 hour(s))   EKG, 12 LEAD, INITIAL    Collection Time: 01/11/20 11:43 AM   Result Value Ref Range    Ventricular Rate 93 BPM    Atrial Rate 93 BPM    P-R Interval 136 ms    QRS Duration 66 ms    Q-T Interval 362 ms    QTC Calculation (Bezet) 450 ms    Calculated P Axis 28 degrees    Calculated R Axis -39 degrees    Calculated T Axis 52 degrees    Diagnosis       Normal sinus rhythm  Left axis deviation  Low voltage QRS  Septal infarct , age undetermined  Inferior infarct , age undetermined  Abnormal ECG  When compared with ECG of 20-OCT-2016 10:18,  Inferior infarct is now present     CBC WITH AUTOMATED DIFF    Collection Time: 01/11/20 11:57 AM   Result Value Ref Range    WBC 8.5 4.6 - 13.2 K/uL    RBC 4.47 4.20 - 5.30 M/uL    HGB 13.2 12.0 - 16.0 g/dL    HCT 40.1 35.0 - 45.0 %    MCV 89.7 74.0 - 97.0 FL    MCH 29.5 24.0 - 34.0 PG    MCHC 32.9 31.0 - 37.0 g/dL    RDW 13.2 11.6 - 14.5 %    PLATELET 720 010 - 476 K/uL    MPV 10.4 9.2 - 11.8 FL    NEUTROPHILS 63 40 - 73 %    LYMPHOCYTES 24 21 - 52 %    MONOCYTES 10 3 - 10 %    EOSINOPHILS 3 0 - 5 %    BASOPHILS 0 0 - 2 %    ABS. NEUTROPHILS 5.5 1.8 - 8.0 K/UL    ABS. LYMPHOCYTES 2.0 0.9 - 3.6 K/UL    ABS. MONOCYTES 0.8 0.05 - 1.2 K/UL    ABS. EOSINOPHILS 0.2 0.0 - 0.4 K/UL    ABS.  BASOPHILS 0.0 0.0 - 0.1 K/UL    DF AUTOMATED     METABOLIC PANEL, COMPREHENSIVE    Collection Time: 01/11/20 11:57 AM   Result Value Ref Range    Sodium 140 136 - 145 mmol/L    Potassium 4.3 3.5 - 5.5 mmol/L    Chloride 109 100 - 111 mmol/L    CO2 27 21 - 32 mmol/L    Anion gap 4 3.0 - 18 mmol/L    Glucose 107 (H) 74 - 99 mg/dL    BUN 18 7.0 - 18 MG/DL    Creatinine 0.83 0.6 - 1.3 MG/DL    BUN/Creatinine ratio 22 (H) 12 - 20      GFR est AA >60 >60 ml/min/1.73m2    GFR est non-AA >60 >60 ml/min/1.73m2    Calcium 9.3 8.5 - 10.1 MG/DL    Bilirubin, total 0.2 0.2 - 1.0 MG/DL    ALT (SGPT) 42 13 - 56 U/L    AST (SGOT) 29 10 - 38 U/L    Alk. phosphatase 142 (H) 45 - 117 U/L    Protein, total 7.4 6.4 - 8.2 g/dL    Albumin 3.9 3.4 - 5.0 g/dL    Globulin 3.5 2.0 - 4.0 g/dL    A-G Ratio 1.1 0.8 - 1.7     CARDIAC PANEL,(CK, CKMB & TROPONIN)    Collection Time: 01/11/20 11:57 AM   Result Value Ref Range    CK 79 26 - 192 U/L    CK - MB <1.0 <3.6 ng/ml    CK-MB Index  0.0 - 4.0 %     CALCULATION NOT PERFORMED WHEN RESULT IS BELOW LINEAR LIMIT    Troponin-I, QT <0.02 0.0 - 0.045 NG/ML   NT-PRO BNP    Collection Time: 01/11/20 11:57 AM   Result Value Ref Range    NT pro- 0 - 900 PG/ML   TROPONIN I    Collection Time: 01/11/20  1:39 PM   Result Value Ref Range    Troponin-I, QT <0.02 0.0 - 0.045 NG/ML   CARDIAC PANEL,(CK, CKMB & TROPONIN)    Collection Time: 01/11/20  4:11 PM   Result Value Ref Range    CK 64 26 - 192 U/L    CK - MB <1.0 <3.6 ng/ml    CK-MB Index  0.0 - 4.0 %     CALCULATION NOT PERFORMED WHEN RESULT IS BELOW LINEAR LIMIT    Troponin-I, QT <0.02 0.0 - 0.045 NG/ML       Radiologic Studies -   CTA CHEST ABD PELV W CONT   Final Result   Impression:      1. No dissection, intramural hematoma or pulmonary embolus. 2. Focal outpouching at the expected previous location of ductus arteriosus. 3. Cholecystectomy, hysterectomy and appendectomy. 4. Atherosclerosis. There is some narrowing of the renal artery origins   bilaterally, left greater than right. 5. Status post LAD stent placement with left ventricular findings of previous   myocardial ischemia.    6. Please see report of multiple additional findings and further details. XR CHEST PORT   Final Result   IMPRESSION: No acute findings            Medical Decision Making   I am the first provider for this patient. I reviewed the vital signs, available nursing notes, past medical history, past surgical history, family history and social history. Vital Signs-Reviewed the patient's vital signs. EKG: normal sinus rhythm, no ST or T wave changes    Records Reviewed: Nursing Notes, Old Medical Records, Previous electrocardiograms, Previous Radiology Studies and Previous Laboratory Studies (Time of Review: 12:25 PM)    ED Course: Progress Notes, Reevaluation, and Consults:         Provider Notes (Medical Decision Making):   MDM  Number of Diagnoses or Management Options  Atypical chest pain:   History of chronic hypertension:   History of coronary artery disease:   History of coronary artery stent placement:   Diagnosis management comments: 55-year-old female with a history of coronary artery disease with stents x7, hypertension, and hypercholesterolemia presenting for chest pain that was constant since yesterday afternoon. \"Feels like her prior heart attack\". Took 81 mg aspirin x3 prior to arrival.  Patient appears in pain upon ED arrival.  Initial EKG normal sinus rhythm without ST elevations. Vital signs reassuring. Patient given 2 sublingual nitro for chest pain, but she got a very severe migraine headache and refused further nitroglycerin including a nitro drip. Patient reevaluated, still having significant chest pain. Given morphine, Zofran. Dr. Coy Ledezma discussed patient with Dr. Yanci Beltre who will come evaluate the patient. No acute findings on chest x-ray. Alk phos 142, otherwise unremarkable CMP. proBNP normal.  Cardiac panel negative. CBC within normal limits. No changes on repeat EKG (3). repeat cardiac panels remain negative. Patient had three troponins checked. CTA chest abdomen pelvis negative for dissection. Discussed results with the patient. Patient's work-up has been negative for any cardiac or pulmonary causes of her chest pain. She is feeling better and would like to go home. Has capacity to make her own decisions, we discussed risks of going home without further evaluation, including the risk of death. Patient understands the risks and states that she will follow-up with her cardiologist Dr. Avril Cerna on Monday. She agrees to come back to the ER if her symptoms return. Strict return precautions given. ATTENDING ATTESTATION:  I personally saw and examined the patient. I have reviewed and agree with the residents findings Dr. Shayy Carlson, including all diagnostic interpretations, and plans as written. I was present during the key portions of separately billed procedures. Kerri Michele MD       Amount and/or Complexity of Data Reviewed  Clinical lab tests: ordered and reviewed  Tests in the radiology section of CPT®: ordered and reviewed  Tests in the medicine section of CPT®: ordered and reviewed  Review and summarize past medical records: yes  Discuss the patient with other providers: yes  Independent visualization of images, tracings, or specimens: yes        Procedures        Diagnosis     Clinical Impression:   1. Atypical chest pain    2. History of coronary artery disease    3. History of coronary artery stent placement    4.  History of chronic hypertension        Disposition: home    Follow-up Information     Follow up With Specialties Details Why Contact Info    Shailesh Lemons MD Family Practice  As needed [address]      [facility] EMERGENCY DEPT Emergency Medicine  As needed, If symptoms worsen [address], Cabrera Ventura MD Cardiology Schedule an appointment as soon as possible for a visit   [facility]  [phone]             Patient's Medications   Start Taking No medications on file   Continue Taking    ASPIRIN DELAYED-RELEASE 81 MG TABLET    Take 1 Tab by mouth daily. BUTALBITAL-ACETAMINOPHEN-CAFFEINE -40 MG LOLLIPOP    400 mcg by Buccal route three (3) times daily. BUTORPHANOL TARTRATE IJ    by Injection route as needed. 10 mg/ml QD    CLOPIDOGREL (PLAVIX) 75 MG TAB    Take 1 Tab by mouth daily. FAMOTIDINE (PEPCID) 40 MG TABLET    Take 1 Tab by mouth daily. HYDROMORPHONE (DILAUDID) 2 MG TABLET    Take 1 Tab by mouth every four (4) hours as needed. Max Daily Amount: 12 mg. LEVOTHYROXINE (SYNTHROID) 75 MCG TABLET    Take 75 mcg by mouth Daily (before breakfast). LISINOPRIL (PRINIVIL, ZESTRIL) 10 MG TABLET    Take 10 mg by mouth daily. NITROGLYCERIN (NITROSTAT) 0.4 MG SL TABLET    1 Tab by SubLINGual route every five (5) minutes as needed for Chest Pain. ROSUVASTATIN (CRESTOR) 40 MG TABLET    Take 1 Tab by mouth nightly. ZOLPIDEM (AMBIEN) 10 MG TABLET    Take 10 mg by mouth nightly as needed. These Medications have changed    No medications on file   Stop Taking    No medications on file     Disclaimer: Sections of this note are dictated using utilizing voice recognition software. Minor typographical errors may be present. If questions arise, please do not hesitate to contact me or call our department.

## 2020-01-11 NOTE — ED TRIAGE NOTES
Patient complaints of mid-sternal chest pain this was off/on for several days however it is now constant with no relief at home. Denies any radiating pain, nausea vomiting and shortness of breath at this time or with the onset of symptoms.

## 2020-01-12 LAB
ATRIAL RATE: 80 BPM
ATRIAL RATE: 93 BPM
ATRIAL RATE: 97 BPM
CALCULATED P AXIS, ECG09: 28 DEGREES
CALCULATED P AXIS, ECG09: 36 DEGREES
CALCULATED P AXIS, ECG09: 41 DEGREES
CALCULATED R AXIS, ECG10: -39 DEGREES
CALCULATED R AXIS, ECG10: -43 DEGREES
CALCULATED R AXIS, ECG10: -46 DEGREES
CALCULATED T AXIS, ECG11: 38 DEGREES
CALCULATED T AXIS, ECG11: 52 DEGREES
CALCULATED T AXIS, ECG11: 61 DEGREES
DIAGNOSIS, 93000: NORMAL
P-R INTERVAL, ECG05: 122 MS
P-R INTERVAL, ECG05: 124 MS
P-R INTERVAL, ECG05: 136 MS
Q-T INTERVAL, ECG07: 362 MS
Q-T INTERVAL, ECG07: 368 MS
Q-T INTERVAL, ECG07: 382 MS
QRS DURATION, ECG06: 66 MS
QRS DURATION, ECG06: 68 MS
QRS DURATION, ECG06: 70 MS
QTC CALCULATION (BEZET), ECG08: 440 MS
QTC CALCULATION (BEZET), ECG08: 450 MS
QTC CALCULATION (BEZET), ECG08: 467 MS
VENTRICULAR RATE, ECG03: 80 BPM
VENTRICULAR RATE, ECG03: 93 BPM
VENTRICULAR RATE, ECG03: 97 BPM

## 2020-01-14 ENCOUNTER — APPOINTMENT (OUTPATIENT)
Dept: GENERAL RADIOLOGY | Age: 60
End: 2020-01-14
Attending: PHYSICIAN ASSISTANT
Payer: MEDICARE

## 2020-01-14 ENCOUNTER — TELEPHONE (OUTPATIENT)
Dept: CARDIOLOGY CLINIC | Age: 60
End: 2020-01-14

## 2020-01-14 ENCOUNTER — HOSPITAL ENCOUNTER (OUTPATIENT)
Age: 60
Setting detail: OBSERVATION
Discharge: HOME | End: 2020-01-16
Attending: EMERGENCY MEDICINE | Admitting: INTERNAL MEDICINE
Payer: MEDICARE

## 2020-01-14 DIAGNOSIS — R07.89 ATYPICAL CHEST PAIN: ICD-10-CM

## 2020-01-14 DIAGNOSIS — R07.9 CHEST PAIN, UNSPECIFIED TYPE: Primary | ICD-10-CM

## 2020-01-14 DIAGNOSIS — I25.10 CORONARY ARTERY DISEASE INVOLVING NATIVE CORONARY ARTERY OF NATIVE HEART WITHOUT ANGINA PECTORIS: ICD-10-CM

## 2020-01-14 LAB
ALBUMIN SERPL-MCNC: 3.9 G/DL (ref 3.4–5)
ALBUMIN/GLOB SERPL: 1.2 {RATIO} (ref 0.8–1.7)
ALP SERPL-CCNC: 160 U/L (ref 45–117)
ALT SERPL-CCNC: 57 U/L (ref 13–56)
ANION GAP SERPL CALC-SCNC: 5 MMOL/L (ref 3–18)
AST SERPL-CCNC: 38 U/L (ref 10–38)
BASOPHILS # BLD: 0 K/UL (ref 0–0.1)
BASOPHILS NFR BLD: 0 % (ref 0–2)
BILIRUB SERPL-MCNC: 0.2 MG/DL (ref 0.2–1)
BUN SERPL-MCNC: 17 MG/DL (ref 7–18)
BUN/CREAT SERPL: 22 (ref 12–20)
CALCIUM SERPL-MCNC: 9.4 MG/DL (ref 8.5–10.1)
CHLORIDE SERPL-SCNC: 110 MMOL/L (ref 100–111)
CK MB CFR SERPL CALC: NORMAL % (ref 0–4)
CK MB CFR SERPL CALC: NORMAL % (ref 0–4)
CK MB SERPL-MCNC: <1 NG/ML (ref 5–25)
CK MB SERPL-MCNC: <1 NG/ML (ref 5–25)
CK SERPL-CCNC: 58 U/L (ref 26–192)
CK SERPL-CCNC: 73 U/L (ref 26–192)
CO2 SERPL-SCNC: 28 MMOL/L (ref 21–32)
CREAT SERPL-MCNC: 0.79 MG/DL (ref 0.6–1.3)
DIFFERENTIAL METHOD BLD: NORMAL
EOSINOPHIL # BLD: 0.3 K/UL (ref 0–0.4)
EOSINOPHIL NFR BLD: 3 % (ref 0–5)
ERYTHROCYTE [DISTWIDTH] IN BLOOD BY AUTOMATED COUNT: 13 % (ref 11.6–14.5)
GLOBULIN SER CALC-MCNC: 3.3 G/DL (ref 2–4)
GLUCOSE SERPL-MCNC: 111 MG/DL (ref 74–99)
HCT VFR BLD AUTO: 40.2 % (ref 35–45)
HGB BLD-MCNC: 13.4 G/DL (ref 12–16)
INR PPP: 0.9 (ref 0.8–1.2)
LIPASE SERPL-CCNC: 111 U/L (ref 73–393)
LYMPHOCYTES # BLD: 2.6 K/UL (ref 0.9–3.6)
LYMPHOCYTES NFR BLD: 27 % (ref 21–52)
MCH RBC QN AUTO: 29.8 PG (ref 24–34)
MCHC RBC AUTO-ENTMCNC: 33.3 G/DL (ref 31–37)
MCV RBC AUTO: 89.3 FL (ref 74–97)
MONOCYTES # BLD: 0.8 K/UL (ref 0.05–1.2)
MONOCYTES NFR BLD: 8 % (ref 3–10)
NEUTS SEG # BLD: 5.8 K/UL (ref 1.8–8)
NEUTS SEG NFR BLD: 62 % (ref 40–73)
PLATELET # BLD AUTO: 261 K/UL (ref 135–420)
PMV BLD AUTO: 9.8 FL (ref 9.2–11.8)
POTASSIUM SERPL-SCNC: 3.9 MMOL/L (ref 3.5–5.5)
PROT SERPL-MCNC: 7.2 G/DL (ref 6.4–8.2)
PROTHROMBIN TIME: 12.1 SEC (ref 11.5–15.2)
RBC # BLD AUTO: 4.5 M/UL (ref 4.2–5.3)
SODIUM SERPL-SCNC: 143 MMOL/L (ref 136–145)
TROPONIN I SERPL-MCNC: <0.02 NG/ML (ref 0–0.04)
TROPONIN I SERPL-MCNC: <0.02 NG/ML (ref 0–0.04)
WBC # BLD AUTO: 9.4 K/UL (ref 4.6–13.2)

## 2020-01-14 PROCEDURE — 74011250637 HC RX REV CODE- 250/637: Performed by: HOSPITALIST

## 2020-01-14 PROCEDURE — 74011000250 HC RX REV CODE- 250: Performed by: HOSPITALIST

## 2020-01-14 PROCEDURE — 99285 EMERGENCY DEPT VISIT HI MDM: CPT

## 2020-01-14 PROCEDURE — 80053 COMPREHEN METABOLIC PANEL: CPT

## 2020-01-14 PROCEDURE — 83690 ASSAY OF LIPASE: CPT

## 2020-01-14 PROCEDURE — 93005 ELECTROCARDIOGRAM TRACING: CPT

## 2020-01-14 PROCEDURE — 71046 X-RAY EXAM CHEST 2 VIEWS: CPT

## 2020-01-14 PROCEDURE — 65660000000 HC RM CCU STEPDOWN

## 2020-01-14 PROCEDURE — 74011250636 HC RX REV CODE- 250/636: Performed by: HOSPITALIST

## 2020-01-14 PROCEDURE — 74011250637 HC RX REV CODE- 250/637: Performed by: STUDENT IN AN ORGANIZED HEALTH CARE EDUCATION/TRAINING PROGRAM

## 2020-01-14 PROCEDURE — 82550 ASSAY OF CK (CPK): CPT

## 2020-01-14 PROCEDURE — 96374 THER/PROPH/DIAG INJ IV PUSH: CPT

## 2020-01-14 PROCEDURE — 65390000012 HC CONDITION CODE 44 OBSERVATION

## 2020-01-14 PROCEDURE — 85025 COMPLETE CBC W/AUTO DIFF WBC: CPT

## 2020-01-14 PROCEDURE — C9113 INJ PANTOPRAZOLE SODIUM, VIA: HCPCS | Performed by: HOSPITALIST

## 2020-01-14 PROCEDURE — 85610 PROTHROMBIN TIME: CPT

## 2020-01-14 PROCEDURE — 74011250636 HC RX REV CODE- 250/636: Performed by: FAMILY MEDICINE

## 2020-01-14 PROCEDURE — 74011250636 HC RX REV CODE- 250/636: Performed by: STUDENT IN AN ORGANIZED HEALTH CARE EDUCATION/TRAINING PROGRAM

## 2020-01-14 RX ORDER — NITROGLYCERIN 0.4 MG/1
0.4 TABLET SUBLINGUAL
Status: COMPLETED | OUTPATIENT
Start: 2020-01-14 | End: 2020-01-14

## 2020-01-14 RX ORDER — FENTANYL CITRATE 50 UG/ML
25 INJECTION, SOLUTION INTRAMUSCULAR; INTRAVENOUS
Status: DISCONTINUED | OUTPATIENT
Start: 2020-01-14 | End: 2020-01-14

## 2020-01-14 RX ORDER — ACETAMINOPHEN 325 MG/1
650 TABLET ORAL
Status: DISCONTINUED | OUTPATIENT
Start: 2020-01-14 | End: 2020-01-16 | Stop reason: HOSPADM

## 2020-01-14 RX ORDER — FENTANYL CITRATE 50 UG/ML
25 INJECTION, SOLUTION INTRAMUSCULAR; INTRAVENOUS ONCE
Status: COMPLETED | OUTPATIENT
Start: 2020-01-14 | End: 2020-01-14

## 2020-01-14 RX ORDER — ONDANSETRON 2 MG/ML
4 INJECTION INTRAMUSCULAR; INTRAVENOUS
Status: DISCONTINUED | OUTPATIENT
Start: 2020-01-14 | End: 2020-01-16 | Stop reason: HOSPADM

## 2020-01-14 RX ORDER — SODIUM CHLORIDE 9 MG/ML
100 INJECTION, SOLUTION INTRAVENOUS CONTINUOUS
Status: DISCONTINUED | OUTPATIENT
Start: 2020-01-14 | End: 2020-01-16

## 2020-01-14 RX ORDER — NITROGLYCERIN 0.4 MG/1
0.4 TABLET SUBLINGUAL
Qty: 25 TAB | Refills: 3 | Status: SHIPPED | OUTPATIENT
Start: 2020-01-14 | End: 2020-01-16

## 2020-01-14 RX ORDER — DIPHENHYDRAMINE HYDROCHLORIDE 50 MG/ML
25 INJECTION, SOLUTION INTRAMUSCULAR; INTRAVENOUS
Status: DISCONTINUED | OUTPATIENT
Start: 2020-01-14 | End: 2020-01-16 | Stop reason: HOSPADM

## 2020-01-14 RX ORDER — ENOXAPARIN SODIUM 100 MG/ML
40 INJECTION SUBCUTANEOUS EVERY 24 HOURS
Status: DISCONTINUED | OUTPATIENT
Start: 2020-01-14 | End: 2020-01-16 | Stop reason: HOSPADM

## 2020-01-14 RX ORDER — TRAMADOL HYDROCHLORIDE 50 MG/1
50 TABLET ORAL
Status: DISCONTINUED | OUTPATIENT
Start: 2020-01-14 | End: 2020-01-15

## 2020-01-14 RX ORDER — NALOXONE HYDROCHLORIDE 0.4 MG/ML
0.4 INJECTION, SOLUTION INTRAMUSCULAR; INTRAVENOUS; SUBCUTANEOUS
Status: DISCONTINUED | OUTPATIENT
Start: 2020-01-14 | End: 2020-01-16 | Stop reason: HOSPADM

## 2020-01-14 RX ADMIN — FENTANYL CITRATE 25 MCG: 50 INJECTION, SOLUTION INTRAMUSCULAR; INTRAVENOUS at 22:57

## 2020-01-14 RX ADMIN — ENOXAPARIN SODIUM 40 MG: 40 INJECTION SUBCUTANEOUS at 19:07

## 2020-01-14 RX ADMIN — NITROGLYCERIN 0.4 MG: 0.4 TABLET SUBLINGUAL at 22:58

## 2020-01-14 RX ADMIN — NITROGLYCERIN 0.4 MG: 0.4 TABLET SUBLINGUAL at 17:51

## 2020-01-14 RX ADMIN — FENTANYL CITRATE 25 MCG: 50 INJECTION, SOLUTION INTRAMUSCULAR; INTRAVENOUS at 17:52

## 2020-01-14 RX ADMIN — NITROGLYCERIN 0.4 MG: 0.4 TABLET SUBLINGUAL at 21:22

## 2020-01-14 RX ADMIN — FENTANYL CITRATE 25 MCG: 50 INJECTION, SOLUTION INTRAMUSCULAR; INTRAVENOUS at 21:21

## 2020-01-14 RX ADMIN — SODIUM CHLORIDE 100 ML/HR: 900 INJECTION, SOLUTION INTRAVENOUS at 17:55

## 2020-01-14 RX ADMIN — PANTOPRAZOLE SODIUM 40 MG: 40 INJECTION, POWDER, FOR SOLUTION INTRAVENOUS at 21:29

## 2020-01-14 NOTE — Clinical Note
TRANSFER - OUT REPORT:     Verbal report given to: Pepe Castillo RN. Report consisted of patient's Situation, Background, Assessment and   Recommendations(SBAR). Opportunity for questions and clarification was provided. Patient transported with a Cardiac Cath Tech / Patient Care Tech. Patient transported to: [address]

## 2020-01-14 NOTE — Clinical Note
Contrast Dose Calculator:   Patient's age: 63.   Patient's sex: Female. Patient weight (kg) = 72.1. Creatinine level (mg/dL) = 0.7. Creatinine clearance (mL/min): 100. Contrast concentration (mg/mL) = 300. MACD = 300 mL. Max Contrast dose per Creatinine Cl calculator = 225 mL.

## 2020-01-14 NOTE — Clinical Note
TRANSFER - IN REPORT:     Verbal report received from: [facility] . Report consisted of patient's Situation, Background, Assessment and   Recommendations(SBAR). Opportunity for questions and clarification was provided. Assessment completed upon patient's arrival to unit and care assumed. Patient transported with a Cardiac Cath Tech / Patient Care Tech.

## 2020-01-14 NOTE — ED PROVIDER NOTES
EMERGENCY DEPARTMENT HISTORY AND PHYSICAL EXAM    4:43 PM      Date: 1/14/2020  Patient Name: Leory Heimlich    History of Presenting Illness     Chief Complaint   Patient presents with    Chest Pain         History Provided By: Patient and Patient's   Location/Duration/Severity/Modifying factors   HPI    Ms Sandra Bagley is a 63year-old  female with a past medical history significant for CAD, stents x7,  HTN, and dyslipidemia who presents with substernal chest pain, onset five days ago. She states the pain is non radiating and has gradually worsened, characterized by a constant pressure on her chest. She describes she was seen here four days ago at which time a significant cardiac workup was obtained including bedside cardiology consult. Patient describes that her chest pain briefly felt better during hospital stay and she was discharged to home with follow up with her cardiologist, Dr Nandini Gordon. She states she did call and was in process of being scheduled for her outpatient stress test and echo which she was not called back for. She describes that she then decided to present to her PCP this morning who recommended that due to her constant chest pain she present to the ED for evaluation. Patient denies any nausea, vomiting, sweating, left arm pain, jaw pain, shortness of breath, or abdominal pain.     PCP: Madi Sidhu MD    Current Facility-Administered Medications   Medication Dose Route Frequency Provider Last Rate Last Dose    0.9% sodium chloride infusion  100 mL/hr IntraVENous CONTINUOUS Willie Salmeron  mL/hr at 01/14/20 1755 100 mL/hr at 01/14/20 1755    acetaminophen (TYLENOL) tablet 650 mg  650 mg Oral Q4H PRN Branda Link, DO        enoxaparin (LOVENOX) injection 40 mg  40 mg SubCUTAneous Q24H Branda Link, DO   40 mg at 01/14/20 1907    traMADol (ULTRAM) tablet 50 mg  50 mg Oral Q6H PRN Branda Link, DO         Current Outpatient Medications   Medication Sig Dispense Refill    nitroglycerin (NITROSTAT) 0.4 mg SL tablet 1 Tab by SubLINGual route every five (5) minutes as needed for Chest Pain. 25 Tab 3    clopidogrel (PLAVIX) 75 mg tab Take 1 Tab by mouth daily. 90 Tab 3    rosuvastatin (CRESTOR) 40 mg tablet Take 1 Tab by mouth nightly. 90 Tab 3    aspirin delayed-release 81 mg tablet Take 1 Tab by mouth daily. 1 Tab 0    levothyroxine (SYNTHROID) 75 mcg tablet Take 75 mcg by mouth Daily (before breakfast).  butalbital-acetaminophen-caffeine -40 mg lollipop 400 mcg by Buccal route three (3) times daily as needed.  zolpidem (AMBIEN) 10 mg tablet Take 10 mg by mouth nightly as needed.  lisinopril (PRINIVIL, ZESTRIL) 10 mg tablet Take 10 mg by mouth daily. Past History     Past Medical History:  Past Medical History:   Diagnosis Date    Abnormal nuclear cardiac imaging test 07/16/2014    Sm-mod apical infarction. No ischemia. EF 60%. Mod apical, apical septal hypk. Neg EKG on max EST. Ex time 6 min.  CAD (coronary artery disease), native coronary artery     Dyslipidemia     Echocardiogram 09/08/2010    EF 50-55%. Mild, diffuse hypk. Mild mid-apical , anterior, apical septal hypk.  Essential hypertension, benign     Headache(784.0)     migraine    History of tobacco use     none since 2001    Hypercholesterolemia     Lower extremity venous duplex 10/05/2011    No evidence of DVT or superficial thrombosis bilaterally.  MI (myocardial infarction) ([facility] [address] 2001; 3/2008    s/p anterior wall MI, 2001; non-ST elevation MI, 2008    Musculoskeletal pain     Other chest pain     S/P cardiac cath 09/10/2010    RI (sm) 90% ostial.  LAD 30% in-stent restenosis. Otherwise patent coronaries. LVEDP 19. EF 60%.       Thyroid disease        Past Surgical History:  Past Surgical History:   Procedure Laterality Date    CARDIAC CATHETERIZATION  10/20/2016         CARDIAC SURG PROCEDURE UNLIST      stent placement     CORONARY ARTERY ANGIOGRAM  10/20/2016         HX APPENDECTOMY          HX CHOLECYSTECTOMY          HX CORONARY STENT PLACEMENT  ; ;     HX GYN      complete hysterectomy     LV ANGIOGRAPHY  10/20/2016            Family History:  Family History   Problem Relation Age of Onset    Heart Disease Father     Heart Disease Maternal Grandfather        Social History:  Social History     Tobacco Use    Smoking status: Former Smoker     Packs/day: 1.00     Years: 10.00     Pack years: 10.00     Last attempt to quit: 10/5/2000     Years since quittin.2    Smokeless tobacco: Never Used   Substance Use Topics    Alcohol use: Yes     Alcohol/week: 1.0 standard drinks     Types: 1 Glasses of wine per week     Comment: occasional alcohol use    Drug use: No       Allergies: Allergies   Allergen Reactions    Nitroglycerin Other (comments)     Could not tolerate long-acting nitroglycerin due to her migraine headaches.  Toprol Xl [Metoprolol Succinate] Other (comments)     Could not tolerate b/c of her migraine headaches         Review of Systems     Review of Systems   Constitutional: Negative for chills, fatigue and fever. HENT: Negative for congestion, ear pain, rhinorrhea and sinus pain. Eyes: Negative for pain, discharge and itching. Respiratory: Positive for chest tightness. Negative for apnea, cough, shortness of breath, wheezing and stridor. Cardiovascular: Positive for chest pain. Negative for palpitations and leg swelling. Gastrointestinal: Negative for abdominal pain, diarrhea, nausea and vomiting. Genitourinary: Negative for dysuria, flank pain, hematuria, urgency and vaginal discharge. Musculoskeletal: Negative for arthralgias, back pain, joint swelling, myalgias and neck pain. Neurological: Negative for dizziness, syncope, numbness and headaches. Psychiatric/Behavioral: The patient is nervous/anxious.           Physical Exam     Visit Vitals  /73   Pulse 76 Temp 98 °F (36.7 °C)   Resp 17   Ht 5' 3\" (1.6 m)   Wt 73.5 kg (162 lb)   SpO2 97%   BMI 28.70 kg/m²       Physical Exam  Constitutional:       General: She is not in acute distress. Appearance: She is well-developed. She is not ill-appearing, toxic-appearing or diaphoretic. HENT:      Head: Normocephalic and atraumatic. Eyes:      Extraocular Movements: Extraocular movements intact. Pupils: Pupils are equal, round, and reactive to light. Neck:      Musculoskeletal: Normal range of motion and neck supple. Cardiovascular:      Rate and Rhythm: Normal rate and regular rhythm. Heart sounds: Normal heart sounds. Pulmonary:      Effort: Pulmonary effort is normal.      Breath sounds: Normal breath sounds. No decreased breath sounds, wheezing, rhonchi or rales. Chest:      Chest wall: No mass, deformity, tenderness or edema. Abdominal:      General: Bowel sounds are normal.      Palpations: Abdomen is soft. Musculoskeletal: Normal range of motion. Right lower leg: No edema. Left lower leg: No edema. Skin:     General: Skin is warm and dry. Neurological:      General: No focal deficit present. Mental Status: She is alert and oriented to person, place, and time.            Diagnostic Study Results     Labs -  Recent Results (from the past 12 hour(s))   EKG, 12 LEAD, INITIAL    Collection Time: 01/14/20  3:46 PM   Result Value Ref Range    Ventricular Rate 86 BPM    Atrial Rate 86 BPM    P-R Interval 128 ms    QRS Duration 70 ms    Q-T Interval 370 ms    QTC Calculation (Bezet) 442 ms    Calculated P Axis 46 degrees    Calculated R Axis -11 degrees    Calculated T Axis 54 degrees    Diagnosis       Normal sinus rhythm  Low voltage QRS  Septal infarct (cited on or before 11-JAN-2020)  Abnormal ECG  When compared with ECG of 11-JAN-2020 13:37,  QRS axis shifted right     CBC WITH AUTOMATED DIFF    Collection Time: 01/14/20  3:51 PM   Result Value Ref Range    WBC 9.4 4.6 - 13.2 K/uL    RBC 4.50 4. 20 - 5.30 M/uL    HGB 13.4 12.0 - 16.0 g/dL    HCT 40.2 35.0 - 45.0 %    MCV 89.3 74.0 - 97.0 FL    MCH 29.8 24.0 - 34.0 PG    MCHC 33.3 31.0 - 37.0 g/dL    RDW 13.0 11.6 - 14.5 %    PLATELET 155 782 - 281 K/uL    MPV 9.8 9.2 - 11.8 FL    NEUTROPHILS 62 40 - 73 %    LYMPHOCYTES 27 21 - 52 %    MONOCYTES 8 3 - 10 %    EOSINOPHILS 3 0 - 5 %    BASOPHILS 0 0 - 2 %    ABS. NEUTROPHILS 5.8 1.8 - 8.0 K/UL    ABS. LYMPHOCYTES 2.6 0.9 - 3.6 K/UL    ABS. MONOCYTES 0.8 0.05 - 1.2 K/UL    ABS. EOSINOPHILS 0.3 0.0 - 0.4 K/UL    ABS. BASOPHILS 0.0 0.0 - 0.1 K/UL    DF AUTOMATED     METABOLIC PANEL, COMPREHENSIVE    Collection Time: 01/14/20  3:51 PM   Result Value Ref Range    Sodium 143 136 - 145 mmol/L    Potassium 3.9 3.5 - 5.5 mmol/L    Chloride 110 100 - 111 mmol/L    CO2 28 21 - 32 mmol/L    Anion gap 5 3.0 - 18 mmol/L    Glucose 111 (H) 74 - 99 mg/dL    BUN 17 7.0 - 18 MG/DL    Creatinine 0.79 0.6 - 1.3 MG/DL    BUN/Creatinine ratio 22 (H) 12 - 20      GFR est AA >60 >60 ml/min/1.73m2    GFR est non-AA >60 >60 ml/min/1.73m2    Calcium 9.4 8.5 - 10.1 MG/DL    Bilirubin, total 0.2 0.2 - 1.0 MG/DL    ALT (SGPT) 57 (H) 13 - 56 U/L    AST (SGOT) 38 10 - 38 U/L    Alk. phosphatase 160 (H) 45 - 117 U/L    Protein, total 7.2 6.4 - 8.2 g/dL    Albumin 3.9 3.4 - 5.0 g/dL    Globulin 3.3 2.0 - 4.0 g/dL    A-G Ratio 1.2 0.8 - 1.7     CARDIAC PANEL,(CK, CKMB & TROPONIN)    Collection Time: 01/14/20  3:51 PM   Result Value Ref Range    CK 73 26 - 192 U/L    CK - MB <1.0 <3.6 ng/ml    CK-MB Index  0.0 - 4.0 %     CALCULATION NOT PERFORMED WHEN RESULT IS BELOW LINEAR LIMIT    Troponin-I, QT <0.02 0.0 - 0.045 NG/ML   PROTHROMBIN TIME + INR    Collection Time: 01/14/20  3:51 PM   Result Value Ref Range    Prothrombin time 12.1 11.5 - 15.2 sec    INR 0.9 0.8 - 1.2         Radiologic Studies -   XR CHEST PA LAT   Final Result   Impression:      No radiographic evidence of acute cardiopulmonary process.             Medical Decision Making   I am the first provider for this patient. I reviewed the vital signs, available nursing notes, past medical history, past surgical history, family history and social history. Vital Signs-Reviewed the patient's vital signs. EKG: NSR. 86 BPM. No evidence of acute ischemia. Records Reviewed: Nursing Notes, Old Medical Records, Previous electrocardiograms, Previous Radiology Studies and Previous Laboratory Studies (Time of Review: 4:43 PM)    ED Course: Progress Notes, Reevaluation, and Consults:     4:44 PM Patient seen and evaluated. Tearful on exam. Results discussed. Cardiology on call paged. 5:00 PM Discussed case with Dr Black Luu, cardiology on call, who recommends admission to medicine with cardiology consult. He recommends Nitro for blood pressure control. Will administer medications once patient is on monitor. 5:06 PM Patient re-evaluated. She states she did take her daily dose of aspirin this morning. 5:19 PM Sublingual Nitro, Fentanyl, IVF started. Patient does NOT have a true allergy to Nitro, causes headaches. 5:40 PM Discussed case with Dr Jose L Nolan, medicine on call, who is in agreement to admission with cardiology consult. Provider Notes (Medical Decision Making):   [facility]    Ms Radha Jackson is a 63year-old  female with a past medical history significant for CAD, stents x7,  HTN, and dyslipidemia who presents with substernal chest pain, onset five days ago. Patient presented for same pain on 1/11 and underwent significant cardiac workup including ECG, troponin, CTA which did not show any evidence of ACS. She was given morphine and Zofran which improved symptoms and saw cardiology on call in the ED. Patient was discharged to home with instructions to follow up with her cardiologist, Dr Avril Cerna. She states she made an appointment for outpatient stress testing but was never called back.  Patient continued to experience worsening chest pain and presented to her PCM who recommended she present to the ED for workup. Patient is hypertensive on arrival 171/93. Vitals otherwise within normal limits. Exam otherwise unremarkable. Patient's CBC, CMP,  Troponin all within normal limits. ECG shows no evidence of acute infarct. Cardiology on call was paged who recommended admission for continued workup of ACS with cardiology evaluation tomorrow. Blood pressure was controlled with sublingual nitro and Fentanyl. Patient has extensive cardiac history with prolonged symptoms. Patient to be admitted to hospitalist service under Dr Brayan Suggs. Procedures        Diagnosis     Clinical Impression:   1) Chest Pain  2) HTN  3) History of CAD with stent placement x7    Disposition: Admission     Follow-up Information    None          Patient's Medications   Start Taking    No medications on file   Continue Taking    ASPIRIN DELAYED-RELEASE 81 MG TABLET    Take 1 Tab by mouth daily. BUTALBITAL-ACETAMINOPHEN-CAFFEINE -40 MG LOLLIPOP    400 mcg by Buccal route three (3) times daily as needed. CLOPIDOGREL (PLAVIX) 75 MG TAB    Take 1 Tab by mouth daily. LEVOTHYROXINE (SYNTHROID) 75 MCG TABLET    Take 75 mcg by mouth Daily (before breakfast). LISINOPRIL (PRINIVIL, ZESTRIL) 10 MG TABLET    Take 10 mg by mouth daily. NITROGLYCERIN (NITROSTAT) 0.4 MG SL TABLET    1 Tab by SubLINGual route every five (5) minutes as needed for Chest Pain. ROSUVASTATIN (CRESTOR) 40 MG TABLET    Take 1 Tab by mouth nightly. ZOLPIDEM (AMBIEN) 10 MG TABLET    Take 10 mg by mouth nightly as needed. These Medications have changed    No medications on file   Stop Taking    BUTORPHANOL TARTRATE IJ    by Injection route as needed. 10 mg/ml QD    FAMOTIDINE (PEPCID) 40 MG TABLET    Take 1 Tab by mouth daily. HYDROMORPHONE (DILAUDID) 2 MG TABLET    Take 1 Tab by mouth every four (4) hours as needed. Max Daily Amount: 12 mg.      Disclaimer: Sections of this note are dictated using utilizing voice recognition software. Minor typographical errors may be present. If questions arise, please do not hesitate to contact me or call our department.

## 2020-01-14 NOTE — TELEPHONE ENCOUNTER
Pt states she is having mid-sternal chest pain. She states that she was in the ED on Saturday for same chest pain and that all tests were negative. Pt states that the pain is worse on inhalation, no diaphoresis, no sweating, no SOB, no orthostatic hypotension/dizziness, no increased swelling. Pt reports some nausea and loss of appetite. Reported above to Dr. Nandini Gordon.   Verbal order and read back per Lara Greenberg MD  Nitro sublingual rx  Echo and Nuc Stress test   F/u next week in cardiology office

## 2020-01-14 NOTE — Clinical Note
Multiple views of the left main, left coronary artery and circumflex artery obtained using hand injection.

## 2020-01-14 NOTE — ED NOTES
Chest pain, nausea. Seen on 1/11. Called cardiologist today and told to come to the ED. I performed a brief evaluation, including history and physical, of the patient here in triage and I have determined that pt will need further treatment and evaluation from the main side ER physician. I have placed initial orders to help in expediting patients care.      January 14, 2020 at 3:42 PM - HAYES Juarez

## 2020-01-15 ENCOUNTER — APPOINTMENT (OUTPATIENT)
Dept: NON INVASIVE DIAGNOSTICS | Age: 60
End: 2020-01-15
Attending: PHYSICIAN ASSISTANT
Payer: MEDICARE

## 2020-01-15 PROBLEM — R07.89 ATYPICAL CHEST PAIN: Status: ACTIVE | Noted: 2020-01-15

## 2020-01-15 LAB
ANION GAP SERPL CALC-SCNC: 4 MMOL/L (ref 3–18)
ATRIAL RATE: 78 BPM
ATRIAL RATE: 78 BPM
ATRIAL RATE: 86 BPM
BASOPHILS # BLD: 0 K/UL (ref 0–0.1)
BASOPHILS NFR BLD: 0 % (ref 0–2)
BUN SERPL-MCNC: 14 MG/DL (ref 7–18)
BUN/CREAT SERPL: 20 (ref 12–20)
CALCIUM SERPL-MCNC: 8.5 MG/DL (ref 8.5–10.1)
CALCULATED P AXIS, ECG09: 32 DEGREES
CALCULATED P AXIS, ECG09: 46 DEGREES
CALCULATED P AXIS, ECG09: 53 DEGREES
CALCULATED R AXIS, ECG10: -11 DEGREES
CALCULATED R AXIS, ECG10: -18 DEGREES
CALCULATED R AXIS, ECG10: -28 DEGREES
CALCULATED T AXIS, ECG11: 29 DEGREES
CALCULATED T AXIS, ECG11: 33 DEGREES
CALCULATED T AXIS, ECG11: 54 DEGREES
CHLORIDE SERPL-SCNC: 114 MMOL/L (ref 100–111)
CO2 SERPL-SCNC: 26 MMOL/L (ref 21–32)
CREAT SERPL-MCNC: 0.7 MG/DL (ref 0.6–1.3)
DIAGNOSIS, 93000: NORMAL
DIFFERENTIAL METHOD BLD: ABNORMAL
ECHO AO ROOT DIAM: 2.07 CM
ECHO LA AREA 4C: 11.9 CM2
ECHO LA VOL 2C: 42.33 ML (ref 22–52)
ECHO LA VOL 4C: 26.52 ML (ref 22–52)
ECHO LA VOL BP: 38.23 ML (ref 22–52)
ECHO LA VOL/BSA BIPLANE: 21.79 ML/M2 (ref 16–28)
ECHO LA VOLUME INDEX A2C: 24.13 ML/M2 (ref 16–28)
ECHO LA VOLUME INDEX A4C: 15.12 ML/M2 (ref 16–28)
ECHO LV E' LATERAL VELOCITY: 10.47 CM/S
ECHO LV E' SEPTAL VELOCITY: 7.6 CM/S
ECHO LV EDV TEICHHOLZ: 0.61 ML
ECHO LV ESV TEICHHOLZ: 0.28 ML
ECHO LV INTERNAL DIMENSION DIASTOLIC: 4.59 CM (ref 3.9–5.3)
ECHO LV INTERNAL DIMENSION SYSTOLIC: 3.31 CM
ECHO LV IVSD: 0.78 CM (ref 0.6–0.9)
ECHO LV MASS 2D: 120 G (ref 67–162)
ECHO LV MASS INDEX 2D: 68.4 G/M2 (ref 43–95)
ECHO LV POSTERIOR WALL DIASTOLIC: 0.71 CM (ref 0.6–0.9)
ECHO LVOT DIAM: 1.95 CM
ECHO LVOT PEAK GRADIENT: 3.1 MMHG
ECHO LVOT PEAK VELOCITY: 87.36 CM/S
ECHO LVOT VTI: 21.06 CM
ECHO MV A VELOCITY: 100.45 CM/S
ECHO MV E DECELERATION TIME (DT): 167.6 MS
ECHO MV E VELOCITY: 93.97 CM/S
ECHO MV E/A RATIO: 0.94
ECHO MV E/E' LATERAL: 8.98
ECHO MV E/E' RATIO (AVERAGED): 10.67
ECHO MV E/E' SEPTAL: 12.36
ECHO RV TAPSE: 1.84 CM (ref 1.5–2)
ECHO TV REGURGITANT MAX VELOCITY: 248.93 CM/S
ECHO TV REGURGITANT PEAK GRADIENT: 24.8 MMHG
EOSINOPHIL # BLD: 0.3 K/UL (ref 0–0.4)
EOSINOPHIL NFR BLD: 4 % (ref 0–5)
ERYTHROCYTE [DISTWIDTH] IN BLOOD BY AUTOMATED COUNT: 13.1 % (ref 11.6–14.5)
GLUCOSE SERPL-MCNC: 100 MG/DL (ref 74–99)
HCT VFR BLD AUTO: 34.2 % (ref 35–45)
HGB BLD-MCNC: 11.5 G/DL (ref 12–16)
LVFS 2D: 28.06 %
LVOT MG: 1.36 MMHG
LVOT MV: 0.53 CM/S
LVSV (TEICH): 29.46 ML
LYMPHOCYTES # BLD: 2.2 K/UL (ref 0.9–3.6)
LYMPHOCYTES NFR BLD: 37 % (ref 21–52)
MCH RBC QN AUTO: 29.9 PG (ref 24–34)
MCHC RBC AUTO-ENTMCNC: 33.6 G/DL (ref 31–37)
MCV RBC AUTO: 88.8 FL (ref 74–97)
MONOCYTES # BLD: 0.7 K/UL (ref 0.05–1.2)
MONOCYTES NFR BLD: 11 % (ref 3–10)
MV DEC SLOPE: 5.61
NEUTS SEG # BLD: 2.7 K/UL (ref 1.8–8)
NEUTS SEG NFR BLD: 48 % (ref 40–73)
P-R INTERVAL, ECG05: 128 MS
P-R INTERVAL, ECG05: 140 MS
P-R INTERVAL, ECG05: 154 MS
PLATELET # BLD AUTO: 213 K/UL (ref 135–420)
PMV BLD AUTO: 9.9 FL (ref 9.2–11.8)
POTASSIUM SERPL-SCNC: 3.9 MMOL/L (ref 3.5–5.5)
Q-T INTERVAL, ECG07: 370 MS
Q-T INTERVAL, ECG07: 382 MS
Q-T INTERVAL, ECG07: 412 MS
QRS DURATION, ECG06: 70 MS
QRS DURATION, ECG06: 72 MS
QRS DURATION, ECG06: 76 MS
QTC CALCULATION (BEZET), ECG08: 435 MS
QTC CALCULATION (BEZET), ECG08: 442 MS
QTC CALCULATION (BEZET), ECG08: 469 MS
RBC # BLD AUTO: 3.85 M/UL (ref 4.2–5.3)
SODIUM SERPL-SCNC: 144 MMOL/L (ref 136–145)
TROPONIN I SERPL-MCNC: <0.02 NG/ML (ref 0–0.04)
TROPONIN I SERPL-MCNC: <0.02 NG/ML (ref 0–0.04)
VENTRICULAR RATE, ECG03: 78 BPM
VENTRICULAR RATE, ECG03: 78 BPM
VENTRICULAR RATE, ECG03: 86 BPM
WBC # BLD AUTO: 5.8 K/UL (ref 4.6–13.2)

## 2020-01-15 PROCEDURE — 85025 COMPLETE CBC W/AUTO DIFF WBC: CPT

## 2020-01-15 PROCEDURE — 74011250636 HC RX REV CODE- 250/636: Performed by: FAMILY MEDICINE

## 2020-01-15 PROCEDURE — 74011250636 HC RX REV CODE- 250/636: Performed by: INTERNAL MEDICINE

## 2020-01-15 PROCEDURE — 80048 BASIC METABOLIC PNL TOTAL CA: CPT

## 2020-01-15 PROCEDURE — 77030013744: Performed by: INTERNAL MEDICINE

## 2020-01-15 PROCEDURE — 74011250637 HC RX REV CODE- 250/637: Performed by: PHYSICIAN ASSISTANT

## 2020-01-15 PROCEDURE — 84484 ASSAY OF TROPONIN QUANT: CPT

## 2020-01-15 PROCEDURE — 74011000250 HC RX REV CODE- 250: Performed by: INTERNAL MEDICINE

## 2020-01-15 PROCEDURE — 77030013797 HC KT TRNSDUC PRSSR EDWD -A: Performed by: INTERNAL MEDICINE

## 2020-01-15 PROCEDURE — 99218 HC RM OBSERVATION: CPT

## 2020-01-15 PROCEDURE — 99152 MOD SED SAME PHYS/QHP 5/>YRS: CPT | Performed by: INTERNAL MEDICINE

## 2020-01-15 PROCEDURE — 93306 TTE W/DOPPLER COMPLETE: CPT

## 2020-01-15 PROCEDURE — 74011000250 HC RX REV CODE- 250: Performed by: HOSPITALIST

## 2020-01-15 PROCEDURE — C1894 INTRO/SHEATH, NON-LASER: HCPCS | Performed by: INTERNAL MEDICINE

## 2020-01-15 PROCEDURE — 65390000012 HC CONDITION CODE 44 OBSERVATION

## 2020-01-15 PROCEDURE — C1760 CLOSURE DEV, VASC: HCPCS | Performed by: INTERNAL MEDICINE

## 2020-01-15 PROCEDURE — 77030004558 HC CATH ANGI DX SUPR TORQ CARD -A: Performed by: INTERNAL MEDICINE

## 2020-01-15 PROCEDURE — C9113 INJ PANTOPRAZOLE SODIUM, VIA: HCPCS | Performed by: HOSPITALIST

## 2020-01-15 PROCEDURE — 74011636320 HC RX REV CODE- 636/320: Performed by: INTERNAL MEDICINE

## 2020-01-15 PROCEDURE — 36415 COLL VENOUS BLD VENIPUNCTURE: CPT

## 2020-01-15 PROCEDURE — 74011250636 HC RX REV CODE- 250/636: Performed by: HOSPITALIST

## 2020-01-15 PROCEDURE — 74011250637 HC RX REV CODE- 250/637

## 2020-01-15 PROCEDURE — 93458 L HRT ARTERY/VENTRICLE ANGIO: CPT | Performed by: INTERNAL MEDICINE

## 2020-01-15 RX ORDER — TRAMADOL HYDROCHLORIDE 50 MG/1
100 TABLET ORAL
Status: DISCONTINUED | OUTPATIENT
Start: 2020-01-15 | End: 2020-01-15

## 2020-01-15 RX ORDER — ASPIRIN 81 MG/1
81 TABLET ORAL DAILY
Status: DISCONTINUED | OUTPATIENT
Start: 2020-01-15 | End: 2020-01-16 | Stop reason: HOSPADM

## 2020-01-15 RX ORDER — FENTANYL CITRATE 50 UG/ML
INJECTION, SOLUTION INTRAMUSCULAR; INTRAVENOUS AS NEEDED
Status: DISCONTINUED | OUTPATIENT
Start: 2020-01-15 | End: 2020-01-15 | Stop reason: HOSPADM

## 2020-01-15 RX ORDER — HYDROMORPHONE HYDROCHLORIDE 1 MG/ML
1 INJECTION, SOLUTION INTRAMUSCULAR; INTRAVENOUS; SUBCUTANEOUS
Status: DISCONTINUED | OUTPATIENT
Start: 2020-01-15 | End: 2020-01-16

## 2020-01-15 RX ORDER — ROSUVASTATIN CALCIUM 20 MG/1
40 TABLET, COATED ORAL
Status: DISCONTINUED | OUTPATIENT
Start: 2020-01-15 | End: 2020-01-16 | Stop reason: HOSPADM

## 2020-01-15 RX ORDER — CLOPIDOGREL BISULFATE 75 MG/1
75 TABLET ORAL DAILY
Status: DISCONTINUED | OUTPATIENT
Start: 2020-01-15 | End: 2020-01-16 | Stop reason: HOSPADM

## 2020-01-15 RX ORDER — TRAMADOL HYDROCHLORIDE 50 MG/1
TABLET ORAL
Status: COMPLETED
Start: 2020-01-15 | End: 2020-01-15

## 2020-01-15 RX ORDER — MORPHINE SULFATE 2 MG/ML
2 INJECTION, SOLUTION INTRAMUSCULAR; INTRAVENOUS ONCE
Status: DISCONTINUED | OUTPATIENT
Start: 2020-01-15 | End: 2020-01-15

## 2020-01-15 RX ORDER — MIDAZOLAM HYDROCHLORIDE 1 MG/ML
INJECTION, SOLUTION INTRAMUSCULAR; INTRAVENOUS AS NEEDED
Status: DISCONTINUED | OUTPATIENT
Start: 2020-01-15 | End: 2020-01-15 | Stop reason: HOSPADM

## 2020-01-15 RX ORDER — AMLODIPINE BESYLATE 5 MG/1
5 TABLET ORAL DAILY
Status: DISCONTINUED | OUTPATIENT
Start: 2020-01-15 | End: 2020-01-16 | Stop reason: HOSPADM

## 2020-01-15 RX ORDER — LIDOCAINE HYDROCHLORIDE 10 MG/ML
INJECTION, SOLUTION EPIDURAL; INFILTRATION; INTRACAUDAL; PERINEURAL AS NEEDED
Status: DISCONTINUED | OUTPATIENT
Start: 2020-01-15 | End: 2020-01-15 | Stop reason: HOSPADM

## 2020-01-15 RX ADMIN — TRAMADOL HYDROCHLORIDE 100 MG: 50 TABLET, FILM COATED ORAL at 04:00

## 2020-01-15 RX ADMIN — ENOXAPARIN SODIUM 40 MG: 40 INJECTION SUBCUTANEOUS at 17:39

## 2020-01-15 RX ADMIN — HYDROMORPHONE HYDROCHLORIDE 1 MG: 1 INJECTION, SOLUTION INTRAMUSCULAR; INTRAVENOUS; SUBCUTANEOUS at 09:43

## 2020-01-15 RX ADMIN — CLOPIDOGREL BISULFATE 75 MG: 75 TABLET ORAL at 09:11

## 2020-01-15 RX ADMIN — ROSUVASTATIN CALCIUM 40 MG: 20 TABLET, COATED ORAL at 21:33

## 2020-01-15 RX ADMIN — TRAMADOL HYDROCHLORIDE 100 MG: 50 TABLET ORAL at 04:00

## 2020-01-15 RX ADMIN — HYDROMORPHONE HYDROCHLORIDE 1 MG: 1 INJECTION, SOLUTION INTRAMUSCULAR; INTRAVENOUS; SUBCUTANEOUS at 21:34

## 2020-01-15 RX ADMIN — PANTOPRAZOLE SODIUM 40 MG: 40 INJECTION, POWDER, FOR SOLUTION INTRAVENOUS at 21:33

## 2020-01-15 RX ADMIN — ASPIRIN 81 MG: 81 TABLET, COATED ORAL at 09:11

## 2020-01-15 RX ADMIN — HYDROMORPHONE HYDROCHLORIDE 1 MG: 1 INJECTION, SOLUTION INTRAMUSCULAR; INTRAVENOUS; SUBCUTANEOUS at 17:39

## 2020-01-15 RX ADMIN — SODIUM CHLORIDE 100 ML/HR: 900 INJECTION, SOLUTION INTRAVENOUS at 09:17

## 2020-01-15 RX ADMIN — AMLODIPINE BESYLATE 5 MG: 5 TABLET ORAL at 09:11

## 2020-01-15 NOTE — ED NOTES
Bedside shift change report given to Geetha Anderson (oncoming nurse) by Tre Anne RN (offgoing nurse). Report included the following information SBAR, ED Summary, Procedure Summary, MAR and Recent Results.

## 2020-01-15 NOTE — CONSULTS
[facility] - Consult Note    Consultation request by Irene Baca DO for advice/opinion related to evaluating Chest pain [R07.9]    Date of  Admission: 1/14/2020  3:44 PM   Primary Care Physician:  Guillermo Yates MD     Assessment:     Patient Active Problem List   Diagnosis Code    CAD (coronary artery disease) I25.10    Essential hypertension, benign I10    Dyslipidemia E78.5    Musculoskeletal pain M79.18    Leg pain, bilateral M79.604, M79.605    Chest pain R07.9       -Chest pain, atypical, constant since Friday, but known CAD and reports symptoms similar to previous anginal complaints, although has previous history of atypical noncardiac chest pain. Patient ruled out for MI. ECG without ischemic changes. -CAD s/p anterolateral wall MI 2001, NSTEMI 2008 requiring PCI to LAD, CP 2010 lead to cath which revealed ostial ramus 90% stenosis medically managed. Last cath for atypical CP 10/2016 revealed: The coronary circulation was right dominant. Left main: Angiographically normal. Left anterior descending: Ostial ISR 50%; MID ISR 50-60%. Ramus Intermediate: Small with ostial 85%. Diagonal Branches: Angiographically normal. Circumflex: Angiographically normal. Obtuse Marginal Branches: Angiographically normal. Right coronary: Angiographically normal.  -Hypertension.  -Dyslipidemia.  -Denies h/o GERD. -Historically not on BB or nitrates due to migraines. Primary cardiologist Dr. Woodrow Langley. Plan:     Independently seen and evaluated. Agree with below. With persistent chest pains, unfortunately, there is no other option but to proceed with repeat coronary angiography to diagnose or exclude CAD as etiology. Her symptom pattern is quite atypical with constant nature over the last 72 hours. Nevertheless, she does have known history of diffuse three-vessel disease. All questions were answered. This was discussed with the patient at length.   She wishes to proceed with coronary angiography. Will review echocardiogram this AM.  Will keep NPO for possible cath versus stress test.  Would resume home ASA, plavix, statin. Historically not on BB or nitrates due to migraines. Will start norvasc. History of Present Illness: This is a 63 y.o. female admitted for Chest pain [R07.9]. Patient complains of:  CP. Patient is a 63year old female with known CAD who reports since Friday she has had constant CP. Substernal chest pressure. Somewhat worse with deep breathing. Minimal improvement with pain medications. Patient reports this reminds her of previous anginal complaints. Patient was seen in ER over the weekend. She was treated for GERD without improvement. Patient denies SOB, orthopnea, PND, JULISSA, palp, syncope. Cardiac risk factors: dyslipidemia, hypertension      Review of Symptoms:  Except as stated above include:  Constitutional:  negative  Respiratory:  negative  Cardiovascular:  Reports CP. Denies SOB, palp, syncope. Gastrointestinal: negative  Genitourinary:  negative  Musculoskeletal:  Negative  Neurological:  Negative  Dermatological:  Negative  Endocrinological: Negative  Psychological:  Negative         Past Medical History:     Past Medical History:   Diagnosis Date    Abnormal nuclear cardiac imaging test 07/16/2014    Sm-mod apical infarction. No ischemia. EF 60%. Mod apical, apical septal hypk. Neg EKG on max EST. Ex time 6 min.  CAD (coronary artery disease), native coronary artery     Dyslipidemia     Echocardiogram 09/08/2010    EF 50-55%. Mild, diffuse hypk. Mild mid-apical , anterior, apical septal hypk.  Essential hypertension, benign     Headache(784.0)     migraine    History of tobacco use     none since 2001    Hypercholesterolemia     Lower extremity venous duplex 10/05/2011    No evidence of DVT or superficial thrombosis bilaterally.     MI (myocardial infarction) ([address] 2001; 3/2008    s/p anterior wall MI, 2001; non-ST elevation MI, 2008    Musculoskeletal pain     Other chest pain     S/P cardiac cath 09/10/2010    RI (sm) 90% ostial.  LAD 30% in-stent restenosis. Otherwise patent coronaries. LVEDP 19. EF 60%.  Thyroid disease          Social History:     Social History     Socioeconomic History    Marital status:      Spouse name: Not on file    Number of children: Not on file    Years of education: Not on file    Highest education level: Not on file   Tobacco Use    Smoking status: Former Smoker     Packs/day: 1.00     Years: 10.00     Pack years: 10.00     Last attempt to quit: 10/5/2000     Years since quittin.2    Smokeless tobacco: Never Used   Substance and Sexual Activity    Alcohol use: Yes     Alcohol/week: 1.0 standard drinks     Types: 1 Glasses of wine per week     Comment: occasional alcohol use    Drug use: No        Family History:     Family History   Problem Relation Age of Onset    Heart Disease Father     Heart Disease Maternal Grandfather         Medications: Allergies   Allergen Reactions    Nitroglycerin Other (comments)     Could not tolerate long-acting nitroglycerin due to her migraine headaches.     Toprol Xl [Metoprolol Succinate] Other (comments)     Could not tolerate b/c of her migraine headaches        Current Facility-Administered Medications   Medication Dose Route Frequency    traMADol (ULTRAM) tablet 100 mg  100 mg Oral Q6H PRN    0.9% sodium chloride infusion  100 mL/hr IntraVENous CONTINUOUS    acetaminophen (TYLENOL) tablet 650 mg  650 mg Oral Q4H PRN    enoxaparin (LOVENOX) injection 40 mg  40 mg SubCUTAneous Q24H    ondansetron (ZOFRAN) injection 4 mg  4 mg IntraVENous Q4H PRN    naloxone (NARCAN) injection 0.4 mg  0.4 mg IntraVENous EVERY 2 MINUTES AS NEEDED    pantoprazole (PROTONIX) 40 mg in 0.9% sodium chloride 10 mL injection  40 mg IntraVENous Q24H    diphenhydrAMINE (BENADRYL) injection 25 mg  25 mg IntraVENous Q4H PRN         Physical Exam:     Visit Vitals  /79 (BP 1 Location: Right arm, BP Patient Position: At rest)   Pulse 89   Temp 97.7 °F (36.5 °C)   Resp 18   Ht 5' 3\" (1.6 m)   Wt 72.3 kg (159 lb 4.8 oz)   SpO2 100%   BMI 28.22 kg/m²     BP Readings from Last 3 Encounters:   01/15/20 134/79   01/11/20 144/82   02/12/19 130/68     Pulse Readings from Last 3 Encounters:   01/15/20 89   01/11/20 94   02/12/19 74     Wt Readings from Last 3 Encounters:   01/15/20 72.3 kg (159 lb 4.8 oz)   01/11/20 73.2 kg (161 lb 6.4 oz)   02/12/19 73.9 kg (163 lb)       General:  alert, cooperative, no distress, appears stated age  Neck:  no JVD  Lungs:  clear to auscultation bilaterally  Heart:  regular rate and rhythm, S1, S2 normal, no murmur, click, rub or gallop  Abdomen:  abdomen is soft without significant tenderness, masses, organomegaly or guarding  Extremities:  extremities normal, atraumatic, no cyanosis or edema  Skin: Warm and dry.  no hyperpigmentation, vitiligo, or suspicious lesions  Neuro: alert, oriented x3, affect appropriate  Psych: non focal     Data Review:     Recent Labs     01/15/20  0530 01/14/20  1551   WBC 5.8 9.4   HGB 11.5* 13.4   HCT 34.2* 40.2    261     Recent Labs     01/15/20  0530 01/14/20  1551    143   K 3.9 3.9   * 110   CO2 26 28   * 111*   BUN 14 17   CREA 0.70 0.79   CA 8.5 9.4   ALB  --  3.9   SGOT  --  38   ALT  --  57*   INR  --  0.9       Results for orders placed or performed during the hospital encounter of 01/14/20   EKG, 12 LEAD, INITIAL   Result Value Ref Range    Ventricular Rate 86 BPM    Atrial Rate 86 BPM    P-R Interval 128 ms    QRS Duration 70 ms    Q-T Interval 370 ms    QTC Calculation (Bezet) 442 ms    Calculated P Axis 46 degrees    Calculated R Axis -11 degrees    Calculated T Axis 54 degrees    Diagnosis       Normal sinus rhythm  Low voltage QRS  Septal infarct (cited on or before 11-JAN-2020)  Abnormal ECG  When compared with ECG of 11-JAN-2020 13:37,  QRS axis shifted right     Results for orders placed or performed in visit on 10/24/17   AMB POC EKG ROUTINE W/ 12 LEADS, INTER & REP    Narrative    See note       All Cardiac Markers in the last 24 hours:    Lab Results   Component Value Date/Time    CPK 58 01/14/2020 09:00 PM    CPK 73 01/14/2020 03:51 PM    CKMB <1.0 01/14/2020 09:00 PM    CKMB <1.0 01/14/2020 03:51 PM    CKND1  01/14/2020 09:00 PM     CALCULATION NOT PERFORMED WHEN RESULT IS BELOW LINEAR LIMIT    CKND1  01/14/2020 03:51 PM     CALCULATION NOT PERFORMED WHEN RESULT IS BELOW LINEAR LIMIT    Floydene Barbara <0.02 01/15/2020 05:30 AM    TROIQ <0.02 01/14/2020 09:00 PM    TROIQ <0.02 01/14/2020 03:51 PM       Last Lipid:    Lab Results   Component Value Date/Time    Cholesterol, total 146 10/06/2011 01:15 AM    HDL Cholesterol 64 (H) 10/06/2011 01:15 AM    LDL, calculated 58.4 10/06/2011 01:15 AM    Triglyceride 118 10/06/2011 01:15 AM    CHOL/HDL Ratio 2.3 10/06/2011 01:15 AM       Signed By: HAYES Meyer     January 15, 2020

## 2020-01-15 NOTE — ROUTINE PROCESS
TRANSFER - IN REPORT: 
 
Verbal report received from Sherrill RN (name) on Gaynelle Duane  being received from ED (unit) for routine progression of care Report consisted of patients Situation, Background, Assessment and  
Recommendations(SBAR). Information from the following report(s) SBAR, ED Summary, Intake/Output, MAR, Recent Results and Cardiac Rhythm (Sinus) was reviewed with the receiving nurse. Opportunity for questions and clarification was provided. Assessment completed upon patients arrival to unit and care assumed.

## 2020-01-15 NOTE — PROGRESS NOTES
Problem: Falls - Risk of  Goal: *Absence of Falls  Description  Document Nagi Ventura Fall Risk and appropriate interventions in the flowsheet.   Outcome: Progressing Towards Goal  Note: Fall Risk Interventions:            Medication Interventions: Teach patient to arise slowly                   Problem: Patient Education: Go to Patient Education Activity  Goal: Patient/Family Education  Outcome: Progressing Towards Goal     Problem: General Medical Care Plan  Goal: *Vital signs within specified parameters  Outcome: Progressing Towards Goal  Goal: *Labs within defined limits  Outcome: Progressing Towards Goal  Goal: *Absence of infection signs and symptoms  Outcome: Progressing Towards Goal  Goal: *Optimal pain control at patient's stated goal  Outcome: Progressing Towards Goal  Goal: *Skin integrity maintained  Outcome: Progressing Towards Goal  Goal: *Fluid volume balance  Outcome: Progressing Towards Goal  Goal: *Optimize nutritional status  Outcome: Progressing Towards Goal  Goal: *Anxiety reduced or absent  Outcome: Progressing Towards Goal  Goal: *Progressive mobility and function (eg: ADL's)  Outcome: Progressing Towards Goal     Problem: Patient Education: Go to Patient Education Activity  Goal: Patient/Family Education  Outcome: Progressing Towards Goal

## 2020-01-15 NOTE — ROUTINE PROCESS
TRANSFER - OUT REPORT: 
 
Verbal report given to Raj Mohamud RN(name) on Luis Hobson  being transferred to [address] [place] [address](unit) for routine progression of care Report consisted of patients Situation, Background, Assessment and  
Recommendations(SBAR). Information from the following report(s) SBAR, Kardex, ED Summary, Intake/Output, MAR and Recent Results was reviewed with the receiving nurse. Lines:    
 
Opportunity for questions and clarification was provided. Patient transported with: 
 Monitor O2 @ 2 liters Registered Nurse

## 2020-01-15 NOTE — PROGRESS NOTES
Pt c/o of 7/10 chest pain. Tramadol 50mg q6 ordered. Pt stated Tramadol dose did not work in ED. Paged Dr. Anastacia Dickinson. Modified order to 100mg Tramadol q6 per MD order. Will continue to monitor patient.

## 2020-01-15 NOTE — PROGRESS NOTES
conducted an initial consultation and Spiritual Assessment for Shanna Og, who is a 63 y.o.,female. Patient's Primary Language is: [place]. According to the patient's EMR Gnosticism Affiliation is: [facility].     The reason the Patient came to the hospital is:   Patient Active Problem List    Diagnosis Date Noted    Chest pain 10/20/2016    Leg pain, bilateral 10/05/2011    CAD (coronary artery disease)     Essential hypertension, benign     Dyslipidemia     Musculoskeletal pain         The  provided the following Interventions:  Initiated a relationship of care and support. Explored issues of jac, belief, spirituality and Christianity/ritual needs while hospitalized. Listened empathically. Provided chaplaincy education. Provided information about Spiritual Care Services. Offered prayer and assurance of continued prayers on patient's behalf. Chart reviewed. The following outcomes where achieved:  Patient shared limited information about both their medical narrative and spiritual journey/beliefs.  confirmed Patient's Gnosticism Affiliation. Patient processed feeling about current hospitalization. Patient expressed gratitude for 's visit. Assessment:  Patient does not have any Christianity/cultural needs that will affect patient's preferences in health care. There are no spiritual or Christianity issues which require intervention at this time. Plan:  Chaplains will continue to follow and will provide pastoral care on an as needed/requested basis.  recommends bedside caregivers page  on duty if patient shows signs of acute spiritual or emotional distress.     [address]   [phone]

## 2020-01-15 NOTE — PROGRESS NOTES
Problem: General Medical Care Plan  Goal: *Vital signs within specified parameters  Outcome: Progressing Towards Goal     Problem: General Medical Care Plan  Goal: *Labs within defined limits  Outcome: Progressing Towards Goal     Problem: General Medical Care Plan  Goal: *Absence of infection signs and symptoms  Outcome: Progressing Towards Goal     Problem: General Medical Care Plan  Goal: *Optimal pain control at patient's stated goal  Outcome: Progressing Towards Goal

## 2020-01-15 NOTE — PROGRESS NOTES
Hospitalist Progress Note    Patient: Gómez Bush Age: 63 y.o. : 1960 MR#: [id number] SSN: [id number]  Date/Time: 1/15/2020 9:23 AM    DOA: 2020  PCP: Marleni Reaves MD    Subjective:     Chest pain, heaviness without association to chest palpitation or radiation or worsen with inspiration or movement. Has required nitroglycerin and fentanyl in ER with little help. Wants dilaudid this AM.   Troponin has been negative. Has known CAD. Not on BB or nitro  Lipase negative. CTA chest/abd/pelv without event. Has been on PPI drip. ROS: no fever/chills, no headache, no dizziness, no facial pain, no sinus congestion, no cough  No swallowing pain, ++ chest pain, no palpitation, no shortness of breath, no abd pain,  No diarrhea, no urinary complaint, no leg pain or swelling      Assessment/Plan:   1. Chest pain, atypical  2. CAD with stent, negative chest pain, not tolerating Nitro, not on BB. 3.  Cobblestoning at back of tongue, likely GERD   4. HTN   5. Dyslipidemia    6. H/o leg edema, negative DVT  7. Migraine, stable  8. H/o tobacco smoking, not active  9. Hypothyroidism   10. Cholecystectomy, hysterectomy and appendectomy    Currently with chest pain, cannot give Fentanyl on medical floor, will given dilaudid for pain. She cannot tolerate Morphine. Give oxygen. She declined nitroglycerin   Cardiology follows, Echo and NST   Continue ASA, plavix, statin. Continue PPI. Continue with home medications as tolerated.  at bedside updated.    ICS    Full code     Additional Notes:    Time spent >30 minutes    Case discussed with:  [x]Patient  [x]Family  [x]Nursing  []Case Management  DVT Prophylaxis:  [x]Lovenox  []Hep SQ  []SCDs  []Coumadin   []On Heparin gtt    Signed By: Anisa Clements MD     January 15, 2020 9:23 AM              Objective:   VS:   Visit Vitals  /90   Pulse 74   Temp 97 °F (36.1 °C)   Resp 18   Ht 5' 3\" (1.6 m)   Wt 72.1 kg (159 lb)   SpO2 100%   BMI 28.17 kg/m²      Tmax/24hrs: Temp (24hrs), Av.4 °F (36.3 °C), Min:97 °F (36.1 °C), Max:98 °F (36.7 °C)      Intake/Output Summary (Last 24 hours) at 1/15/2020 3117  Last data filed at 1/15/2020 7574  Gross per 24 hour   Intake 200 ml   Output 200 ml   Net 0 ml     Tele: sinus   General:  Cooperative, Not in acute distress, speaks in full sentence while in bed  HEENT: PERRL, EOMI, supple neck, no JVD, dry oral mucosa  Cardiovascular: S1S2 regular, no rub/gallop   Pulmonary: Clear air entry bilaterally, no wheezing, no crackle  GI:  Soft, non tender, non distended, +bs, no guarding   Extremities:  No pedal edema, +distal pulses appreciated   Neuro: AOx3, moving all extremities, no gross deficit.      Additional:       Current Facility-Administered Medications   Medication Dose Route Frequency    traMADol (ULTRAM) tablet 100 mg  100 mg Oral Q6H PRN    amLODIPine (NORVASC) tablet 5 mg  5 mg Oral DAILY    aspirin delayed-release tablet 81 mg  81 mg Oral DAILY    clopidogrel (PLAVIX) tablet 75 mg  75 mg Oral DAILY    rosuvastatin (CRESTOR) tablet 40 mg  40 mg Oral QHS    HYDROmorphone (PF) (DILAUDID) injection 1 mg  1 mg IntraVENous Q4H PRN    0.9% sodium chloride infusion  100 mL/hr IntraVENous CONTINUOUS    acetaminophen (TYLENOL) tablet 650 mg  650 mg Oral Q4H PRN    enoxaparin (LOVENOX) injection 40 mg  40 mg SubCUTAneous Q24H    ondansetron (ZOFRAN) injection 4 mg  4 mg IntraVENous Q4H PRN    naloxone (NARCAN) injection 0.4 mg  0.4 mg IntraVENous EVERY 2 MINUTES AS NEEDED    pantoprazole (PROTONIX) 40 mg in 0.9% sodium chloride 10 mL injection  40 mg IntraVENous Q24H    diphenhydrAMINE (BENADRYL) injection 25 mg  25 mg IntraVENous Q4H PRN            Lab/Data Review:  Labs: Results:       Chemistry Recent Labs     01/15/20  0530 20  1551   * 111*    143   K 3.9 3.9   * 110   CO2 26 28   BUN 14 17   CREA 0.70 0.79   BUCR 20 22*   AGAP 4 5   CA 8.5 9.4     Recent Labs 01/14/20  1551   ALT 57*   SGOT 38   TP 7.2   ALB 3.9   GLOB 3.3   AGRAT 1.2      CBC w/Diff Recent Labs     01/15/20  0530 01/14/20  1551   WBC 5.8 9.4   RBC 3.85* 4.50   HGB 11.5* 13.4   HCT 34.2* 40.2   MCV 88.8 89.3   MCH 29.9 29.8   MCHC 33.6 33.3   RDW 13.1 13.0    261   GRANS 48 62   LYMPH 37 27   EOS 4 3      Coagulation Recent Labs     01/14/20  1551   PTP 12.1   INR 0.9       Iron/Ferritin Lab Results   Component Value Date/Time    Iron 34 (L) 10/08/2011 04:20 AM    TIBC 391 10/08/2011 04:20 AM    Iron % saturation 9 (L) 10/08/2011 04:20 AM    Ferritin 11 10/08/2011 04:20 AM       BNP    Cardiac Enzymes Lab Results   Component Value Date/Time    CK 58 01/14/2020 09:00 PM    CK - MB <1.0 01/14/2020 09:00 PM    CK-MB Index  01/14/2020 09:00 PM     CALCULATION NOT PERFORMED WHEN RESULT IS BELOW LINEAR LIMIT    Troponin-I, QT <0.02 01/15/2020 05:30 AM        Lactic Acid    Thyroid Studies          All Micro Results     None            Images:    CT (Most Recent). CT Results (most recent):  Results from Hospital Encounter encounter on 01/11/20   CTA CHEST ABD PELV W CONT    Narrative CT angiogram of the chest, abdomen and pelvis    History: Midsternal chest pain with waxing and waning severity for last several  days is now constant without relief. No radiation. No nausea, vomiting or  shortness of breath at this time. Technique: Multidetector helical CT angiography was carried out from the  thoracic inlet to the lesser trochanters following intravenous contrast  administration. Thin section image collimation was utilized and 3-D images were  postprocessed on an [address] [place] [address]. Parenchymal organ imaging  will be limited by arterial phase of contrast administration.     CT scans at this facility are performed using dose optimization technique as  appropriate to a performed exam, to include automated exposure control,  adjustment of the mA and/or kV according to patient size (including appropriate  matching for site specific examinations), or use of iterative reconstruction  technique. Comparison: Chest radiograph performed the same day. CT abdomen/pelvis of  October 2011. CT scan thorax:    Pleura: No pleural effusion or pneumothorax. Heart: Mild cardiomegaly. Subendocardial fat deposition noted within the left  ventricle 6, sequela of previous ischemia. Coronary artery atherosclerosis. LAD  stent placement. Lymph nodes: Very small nonspecific mediastinal lymph nodes noted. Pulmonary vessels: Not enlarged. No filling defects identified given limitations  of technique. Pulmonary parenchyma: Trace dependent bibasilar subsegmental atelectasis. CT scan of the abdomen:    Liver: Unremarkable    Spleen: Unremarkable    Pancreas: Unremarkable    Adrenal glands: Unremarkable    Gallbladder: Surgically absent    Retroperitoneum: No suspect mass    Kidneys: Symmetric enhancement. No hydronephrosis. Near complete and/or complete  duplication of the right renal collecting system. Distal ureters not really  delineated. Bowel and mesentery: Underdistended which limits evaluation. Thickening of the  descending and sigmoid colon not excluded. Focal thickening of the splenic  flexure not excluded. Moderate colonic fecal load. Appendectomy. Colonic  diverticulosis. No small bowel dilatation or air-fluid levels. No  pneumoperitoneum. Question mild thickening proximal esophagus. Osseous degenerative changes. Rotoscoliosis. CT scan pelvis:    Urinary bladder: Unremarkable    Bowel: Diverticulosis. Under distended. Thickening not excluded. Lymph nodes: No enlarged lymph nodes. Uterus surgically absent. CT angiography:    No intramural hematoma or aortic dissection. Small focal aortic outpouching near  the presumed remnant of ductus arteriosus. Single renal arteries. Atherosclerosis noted at the left arterial origin causing  mild-to-moderate narrowing. SMA and celiac patent. SAE patent. Common iliacs  patent. Mild atherosclerosis noted throughout. Atherosclerosis noted at the  origin of the celiac without tearing. Atherosclerosis noted at the origin of the  right renal artery causing minimal to mild narrowing. Impression Impression:    1. No dissection, intramural hematoma or pulmonary embolus. 2. Focal outpouching at the expected previous location of ductus arteriosus. 3. Cholecystectomy, hysterectomy and appendectomy. 4. Atherosclerosis. There is some narrowing of the renal artery origins  bilaterally, left greater than right. 5. Status post LAD stent placement with left ventricular findings of previous  myocardial ischemia. 6. Please see report of multiple additional findings and further details. XRAY (Most Recent) XR Results (most recent):  Results from Hospital Encounter encounter on 01/14/20   XR CHEST PA LAT    Narrative EXAM:  XR CHEST PA LAT    INDICATION:   chest pain    COMPARISON: 1/11/2020. FINDINGS:  The cardiac and mediastinal silhouette are within normal limits. Pulmonary  vasculature is within normal limits. No pneumothorax or pleural effusions. No  air space opacity. No acute osseous abnormality. Impression Impression:    No radiographic evidence of acute cardiopulmonary process.           EKG Results for orders placed or performed in visit on 10/24/17   [facility] POC EKG ROUTINE W/ 12 LEADS, INTER & REP     Status: None    Narrative    See note        2D ECHO

## 2020-01-15 NOTE — PROGRESS NOTES
Echocardiogram completed at bedside. Report to follow.     [address] [facility]

## 2020-01-15 NOTE — PROGRESS NOTES
TRANSFER - IN REPORT:    Verbal report received from Percy(name) on Celestino Nguyen  being received from Cath lab(unit) for routine post - op      Report consisted of patients Situation, Background, Assessment and   Recommendations(SBAR). Information from the following report(s) SBAR, Procedure Summary and MAR was reviewed with the receiving nurse. Opportunity for questions and clarification was provided. Assessment completed upon patients arrival to unit and care assumed. Dressing to right groin, clean dry and intact. 1545  TRANSFER - OUT REPORT:    Verbal report given to Dedra(name) on Celestino Nguyen  being transferred to [address] [place] [address]([facility]) for routine progression of care       Report consisted of patients Situation, Background, Assessment and   Recommendations(SBAR). Information from the following report(s) SBAR, Procedure Summary and MAR was reviewed with the receiving nurse. Lines:   Peripheral IV 01/15/20 Left Antecubital (Active)   Site Assessment Clean, dry, & intact 1/15/2020  9:17 AM   Phlebitis Assessment 0 1/15/2020  9:17 AM   Infiltration Assessment 0 1/15/2020  9:17 AM   Dressing Status Clean, dry, & intact 1/15/2020  9:17 AM   Dressing Type Transparent 1/15/2020  9:17 AM   Hub Color/Line Status Pink;Flushed 1/15/2020  9:17 AM   Action Taken Dressing reinforced 1/15/2020 12:35 AM   Alcohol Cap Used Yes 1/15/2020  9:17 AM        Opportunity for questions and clarification was provided.       Patient transported with:   [employer]

## 2020-01-15 NOTE — H&P
History & Physical      Patient: Celestino Nguyen MRN: [id number]  CSN: [id number]    YOB: 1960  Age: 63 y.o. Sex: female      DOA: 1/14/2020    Chief Complaint:   Chief Complaint   Patient presents with    Chest Pain     Pt seen on 1/14/20. Delay in documentation 2/2 [facility] downtime. HPI:     Celestino Nguyen is a 63 y.o. female with PMHx of CAD, NSTEMI, HTN, HLD who presented to the ED with complaints of chest pain. Pt describes substernal CP that started 5 days ago and has been unrelenting and progressive since. Pain is \"10/10,\" currently with no relief after ED administration of IV fentanyl. Came to ED and had evaluation over last weekend and was discharged to f/u with outpatient cardiologist. Reports she tried to f/u with cardiologist but was unable to for various reasons. Family at beside in ED. Pt very anxious, clutches chest, grimaces, cries out in pain; expression of pain is extreme and way out of proportion to what one might expect from any reason. Denies chronic pain medication use but is reportedly allergic to morphine and requests IV Dilaudid. EKG reviewed and shows sinus rhythm in 80's w/o ST changes. VSS. Pt is now admitted with chest pain and concern for ACS. Past Medical History:   Diagnosis Date    Abnormal nuclear cardiac imaging test 07/16/2014    Sm-mod apical infarction. No ischemia. EF 60%. Mod apical, apical septal hypk. Neg EKG on max EST. Ex time 6 min.  CAD (coronary artery disease), native coronary artery     Dyslipidemia     Echocardiogram 09/08/2010    EF 50-55%. Mild, diffuse hypk. Mild mid-apical , anterior, apical septal hypk.  Essential hypertension, benign     Headache(784.0)     migraine    History of tobacco use     none since 2001    Hypercholesterolemia     Lower extremity venous duplex 10/05/2011    No evidence of DVT or superficial thrombosis bilaterally.     MI (myocardial infarction) ([facility] [address] 2001; 3/2008 s/p anterior wall MI, ; non-ST elevation MI,     Musculoskeletal pain     Other chest pain     S/P cardiac cath 09/10/2010    RI (sm) 90% ostial.  LAD 30% in-stent restenosis. Otherwise patent coronaries. LVEDP 19. EF 60%.  Thyroid disease        Past Surgical History:   Procedure Laterality Date    CARDIAC CATHETERIZATION  10/20/2016         CARDIAC SURG PROCEDURE UNLIST      stent placement     CORONARY ARTERY ANGIOGRAM  10/20/2016         HX APPENDECTOMY          HX CHOLECYSTECTOMY          HX CORONARY STENT PLACEMENT  ; ;     HX GYN      complete hysterectomy     LV ANGIOGRAPHY  10/20/2016            Family History   Problem Relation Age of Onset    Heart Disease Father     Heart Disease Maternal Grandfather        Social History     Socioeconomic History    Marital status:      Spouse name: Not on file    Number of children: Not on file    Years of education: Not on file    Highest education level: Not on file   Tobacco Use    Smoking status: Former Smoker     Packs/day: 1.00     Years: 10.00     Pack years: 10.00     Last attempt to quit: 10/5/2000     Years since quittin.2    Smokeless tobacco: Never Used   Substance and Sexual Activity    Alcohol use: Yes     Alcohol/week: 1.0 standard drinks     Types: 1 Glasses of wine per week     Comment: occasional alcohol use    Drug use: No       Prior to Admission medications    Medication Sig Start Date End Date Taking? Authorizing Provider   nitroglycerin (NITROSTAT) 0.4 mg SL tablet 1 Tab by SubLINGual route every five (5) minutes as needed for Chest Pain. 20  Yes Reece Briseno MD   clopidogrel (PLAVIX) 75 mg tab Take 1 Tab by mouth daily. 12/10/19  Yes Dann JADE NP   rosuvastatin (CRESTOR) 40 mg tablet Take 1 Tab by mouth nightly. 19  Yes Dann JADE NP   aspirin delayed-release 81 mg tablet Take 1 Tab by mouth daily.  14  Reece Keenan MD CHRISSIE   levothyroxine (SYNTHROID) 75 mcg tablet Take 75 mcg by mouth Daily (before breakfast). Yes Provider, Historical   butalbital-acetaminophen-caffeine -40 mg lollipop 400 mcg by Buccal route three (3) times daily as needed. Yes Provider, Historical   zolpidem (AMBIEN) 10 mg tablet Take 10 mg by mouth nightly as needed. Yes Provider, Historical   lisinopril (PRINIVIL, ZESTRIL) 10 mg tablet Take 10 mg by mouth daily. Yes Provider, Historical       Allergies   Allergen Reactions    Nitroglycerin Other (comments)     Could not tolerate long-acting nitroglycerin due to her migraine headaches.  Toprol Xl [Metoprolol Succinate] Other (comments)     Could not tolerate b/c of her migraine headaches         Review of Systems  GENERAL: Patient alert, awake and oriented times 3, able to communicate full sentences and not in distress. HEENT: No change in vision, sore throat or sinus congestion. NECK: No pain or stiffness. PULMONARY: No shortness of breath, cough or wheeze. Cardiovascular: + Chest pain. no pnd or orthopnea  GASTROINTESTINAL: No abdominal pain, nausea, vomiting or diarrhea, or blood per rectum. GENITOURINARY: No urinary frequency, urgency, hesitancy or dysuria. MUSCULOSKELETAL: No joint or muscle pain, no back pain, no recent trauma. DERMATOLOGIC: No rash, no itching, no lesions. ENDOCRINE: No polyuria, polydipsia, no heat or cold intolerance. No recent change in weight. HEMATOLOGICAL: No anemia or easy bruising or bleeding. NEUROLOGIC: No headache, seizures, numbness, tingling or weakness. Physical Exam:     Physical Exam:  Visit Vitals  /73   Pulse 76   Temp 98 °F (36.7 °C)   Resp 17   Ht 5' 3\" (1.6 m)   Wt 73.5 kg (162 lb)   SpO2 97%   BMI 28.70 kg/m²      O2 Device: Room air    Temp (24hrs), Av °F (36.7 °C), Min:98 °F (36.7 °C), Max:98 °F (36.7 °C)    No intake/output data recorded. No intake/output data recorded.     General:  Alert, cooperative, no distress, appears stated age. Head: Normocephalic, without obvious abnormality, atraumatic. Eyes:  Conjunctivae/corneas clear. PERRL, EOMs intact. Nose: Nares normal. No drainage or sinus tenderness. Neck: Supple, symmetrical, trachea midline, no adenopathy, thyroid: no enlargement, no carotid bruit and no JVD. Lungs:   Clear to auscultation bilaterally. Heart:  Regular rate and rhythm, S1, S2 normal.     Abdomen: Soft, non-tender. Bowel sounds normal.    Extremities: Extremities normal, atraumatic, no cyanosis or edema. Pulses: 2+ and symmetric all extremities. Skin:  No rashes or lesions   Neurologic: AAOx3, No focal motor or sensory deficit. Procedures/imaging: see electronic medical records for all procedures/Xrays and details which were not copied into this note but were reviewed prior to creation of Plan      Assessment & Plan     Pt seen on 1/14/20. Delay in documentation 2/2 [facility] downtime. Aaron Aceves is a 63 y.o. female with PMHx of CAD, NSTEMI, HTN, HLD who is admitted with chest pain and concern for ACS. 1. Chest pain- atypical   2. Hx CAD   3. HTN   4. HLD   5. Migraine headaches- likely medication overuse headaches as pt has been a habitual user of Fioricet for years   6. Hypothyroidism     Cardiology consulted, will see in am   Need to rule out ACS   Pt's expression of pain is extreme and way out of proportion to what would be expected- Suspect pt is drug-seeking or psychosomatic etiology of pt'ss pain  Tramadol PRN    NTG- although pt declines   Cont ASA, plavix, statin   Echo      DVT prophylaxis: Lovenox   Diet: Cardiac   Contact: Madhav Keith  ()    [phone]  Code Status: FULL   Disposition: Admit to tele observation      Discussed with patient and  at bedside about hospital admission and my plan care, both understood and agree with my plan care.       Rocco Delacruz,    1/14/2020

## 2020-01-15 NOTE — ROUTINE PROCESS
Bedside shift change report given to [facility], RN. Report included SBAR, Kardex, MAR, Recent Results, ED Summary, and Plan of Care. Pt in bed with call light in reach and family member at bedside.

## 2020-01-15 NOTE — PROGRESS NOTES
Shift progress note:  Assumed care of patient in bed awake & alert. Complaining of chest pain 9/10 MD @ bedside Ultram ordered. Patient wanting something stronger. MD Johnny Maya now @ bedside, Subsequent ekg ordered x2 for continued pain. Fentanyl and nitro given x 2 for continued pain. No change in status, call bell within reach. Family remains @ bedside.

## 2020-01-16 VITALS
RESPIRATION RATE: 20 BRPM | BODY MASS INDEX: 28.47 KG/M2 | HEIGHT: 63 IN | HEART RATE: 78 BPM | WEIGHT: 160.7 LBS | SYSTOLIC BLOOD PRESSURE: 117 MMHG | OXYGEN SATURATION: 98 % | TEMPERATURE: 97.6 F | DIASTOLIC BLOOD PRESSURE: 68 MMHG

## 2020-01-16 LAB
ANION GAP SERPL CALC-SCNC: 5 MMOL/L (ref 3–18)
BASOPHILS # BLD: 0 K/UL (ref 0–0.1)
BASOPHILS NFR BLD: 0 % (ref 0–2)
BUN SERPL-MCNC: 11 MG/DL (ref 7–18)
BUN/CREAT SERPL: 15 (ref 12–20)
CALCIUM SERPL-MCNC: 9.1 MG/DL (ref 8.5–10.1)
CHLORIDE SERPL-SCNC: 111 MMOL/L (ref 100–111)
CO2 SERPL-SCNC: 27 MMOL/L (ref 21–32)
CREAT SERPL-MCNC: 0.72 MG/DL (ref 0.6–1.3)
DIFFERENTIAL METHOD BLD: ABNORMAL
END DIASTOLIC PRESSURE: 11
EOSINOPHIL # BLD: 0.2 K/UL (ref 0–0.4)
EOSINOPHIL NFR BLD: 3 % (ref 0–5)
ERYTHROCYTE [DISTWIDTH] IN BLOOD BY AUTOMATED COUNT: 13.1 % (ref 11.6–14.5)
GLUCOSE SERPL-MCNC: 106 MG/DL (ref 74–99)
HCT VFR BLD AUTO: 37 % (ref 35–45)
HGB BLD-MCNC: 12.3 G/DL (ref 12–16)
LYMPHOCYTES # BLD: 1.7 K/UL (ref 0.9–3.6)
LYMPHOCYTES NFR BLD: 21 % (ref 21–52)
MCH RBC QN AUTO: 29.6 PG (ref 24–34)
MCHC RBC AUTO-ENTMCNC: 33.2 G/DL (ref 31–37)
MCV RBC AUTO: 88.9 FL (ref 74–97)
MONOCYTES # BLD: 0.8 K/UL (ref 0.05–1.2)
MONOCYTES NFR BLD: 10 % (ref 3–10)
NEUTS SEG # BLD: 5.3 K/UL (ref 1.8–8)
NEUTS SEG NFR BLD: 66 % (ref 40–73)
PLATELET # BLD AUTO: 244 K/UL (ref 135–420)
PMV BLD AUTO: 10 FL (ref 9.2–11.8)
POTASSIUM SERPL-SCNC: 4.3 MMOL/L (ref 3.5–5.5)
RBC # BLD AUTO: 4.16 M/UL (ref 4.2–5.3)
SODIUM SERPL-SCNC: 143 MMOL/L (ref 136–145)
WBC # BLD AUTO: 8 K/UL (ref 4.6–13.2)

## 2020-01-16 PROCEDURE — 99218 HC RM OBSERVATION: CPT

## 2020-01-16 PROCEDURE — 85025 COMPLETE CBC W/AUTO DIFF WBC: CPT

## 2020-01-16 PROCEDURE — 80048 BASIC METABOLIC PNL TOTAL CA: CPT

## 2020-01-16 PROCEDURE — 74011250637 HC RX REV CODE- 250/637: Performed by: PHYSICIAN ASSISTANT

## 2020-01-16 PROCEDURE — 36415 COLL VENOUS BLD VENIPUNCTURE: CPT

## 2020-01-16 PROCEDURE — 74011250636 HC RX REV CODE- 250/636: Performed by: INTERNAL MEDICINE

## 2020-01-16 RX ORDER — AMLODIPINE BESYLATE 5 MG/1
5 TABLET ORAL
Qty: 30 TAB | Refills: 2 | Status: SHIPPED | OUTPATIENT
Start: 2020-01-16 | End: 2020-04-15

## 2020-01-16 RX ORDER — SUCRALFATE 1 G/1
1 TABLET ORAL
Qty: 28 TAB | Refills: 0 | Status: SHIPPED | OUTPATIENT
Start: 2020-01-16 | End: 2020-01-23

## 2020-01-16 RX ORDER — HYDROMORPHONE HYDROCHLORIDE 2 MG/1
2 TABLET ORAL
Qty: 12 TAB | Refills: 0 | Status: SHIPPED | OUTPATIENT
Start: 2020-01-16 | End: 2020-01-19

## 2020-01-16 RX ORDER — SUCRALFATE 1 G/1
1 TABLET ORAL
Status: DISCONTINUED | OUTPATIENT
Start: 2020-01-16 | End: 2020-01-16 | Stop reason: HOSPADM

## 2020-01-16 RX ORDER — OMEPRAZOLE 40 MG/1
40 CAPSULE, DELAYED RELEASE ORAL
Qty: 60 CAP | Refills: 0 | Status: SHIPPED | OUTPATIENT
Start: 2020-01-16 | End: 2020-02-15

## 2020-01-16 RX ORDER — HYDROMORPHONE HYDROCHLORIDE 2 MG/1
1 TABLET ORAL
Status: DISCONTINUED | OUTPATIENT
Start: 2020-01-16 | End: 2020-01-16 | Stop reason: HOSPADM

## 2020-01-16 RX ADMIN — ASPIRIN 81 MG: 81 TABLET, COATED ORAL at 10:19

## 2020-01-16 RX ADMIN — HYDROMORPHONE HYDROCHLORIDE 1 MG: 1 INJECTION, SOLUTION INTRAMUSCULAR; INTRAVENOUS; SUBCUTANEOUS at 10:19

## 2020-01-16 RX ADMIN — HYDROMORPHONE HYDROCHLORIDE 1 MG: 1 INJECTION, SOLUTION INTRAMUSCULAR; INTRAVENOUS; SUBCUTANEOUS at 05:41

## 2020-01-16 RX ADMIN — CLOPIDOGREL BISULFATE 75 MG: 75 TABLET ORAL at 10:20

## 2020-01-16 RX ADMIN — AMLODIPINE BESYLATE 5 MG: 5 TABLET ORAL at 10:20

## 2020-01-16 NOTE — ROUTINE PROCESS
Bedside shift change report given to Junaid (oncoming nurse) by [facility] (offgoing nurse). Report included the following information SBAR, Kardex and MAR.

## 2020-01-16 NOTE — PROGRESS NOTES
Problem: Falls - Risk of  Goal: *Absence of Falls  Description  Document Negra Low Fall Risk and appropriate interventions in the flowsheet.   Note: Fall Risk Interventions:            Medication Interventions: Patient to call before getting OOB

## 2020-01-16 NOTE — ROUTINE PROCESS
Bedside and verbal report received from [facility] (offgoing nurse). Report included the following information; SBAR, MAR, LABS, Intake/output, Kardex, and summary of care. Patient resting in bed with  at bedside. Patient alert with call bell and phone in reach. No complaints at this time. Will continue to monitor.

## 2020-01-16 NOTE — PROGRESS NOTES
D/C orders received. No needs identified by . Chart reviewed. Pt will be transported home by .  available as needed.     Mark Monroe, RN BSN  Care Manager  [phone]

## 2020-01-16 NOTE — PROGRESS NOTES
Patient has been changed from inpatient to observation and a Condition Code 44 has been completed based on Medicare criteria. The patient / or next of kin has been made aware of this change in status and provided with a copy of Condition Code 44 letter. Observation notice provided in writing to patient and/or caregiver as well as verbal explanation of the policy.   Patients who are in outpatient status also receive the Observation notice    Padmini Hamilton RN BSN  Care Manager  [phone]

## 2020-01-16 NOTE — PROGRESS NOTES
[facility] - Progress Note  Admit Date: 1/14/2020    Assessment:     Hospital Problems  Date Reviewed: 2/12/2019          Codes Class Noted POA    Atypical chest pain ICD-10-CM: R07.89  ICD-9-CM: 786.59  1/15/2020 Unknown        * (Principal) Chest pain ICD-10-CM: R07.9  ICD-9-CM: 786.50  10/20/2016 Yes              -Chest pain, atypical, constant since Friday, but known CAD and reports symptoms similar to previous anginal complaints, although has previous history of atypical noncardiac chest pain. Patient ruled out for MI. ECG without ischemic changes. Cath this admission with low normal LV function with minimal apical hypokinesis. Less than 50% diffuse LAD stents from 2001. No significant changes from 2016 study. No new cardiac recommendations. -CAD s/p anterolateral wall MI 2001, NSTEMI 2008 requiring PCI to LAD, CP 2010 lead to cath which revealed ostial ramus 90% stenosis medically managed. Last cath for atypical CP 10/2016 revealed: The coronary circulation was right dominant. Left main: Angiographically normal. Left anterior descending: Ostial ISR 50%; MID ISR 50-60%. Ramus Intermediate: Small with ostial 85%. Diagonal Branches: Angiographically normal. Circumflex: Angiographically normal. Obtuse Marginal Branches: Angiographically normal. Right coronary: Angiographically normal.  -Hypertension.  -Dyslipidemia.  -Denies h/o GERD. -Historically not on BB or nitrates due to migraines.     Primary cardiologist Dr. James Ferris. Plan:     Seen and evaluated. Agree with below. As noted previously, her coronary angiography yesterday did not show any significant CAD consistent with ACS. No further coronary evaluation is needed at this time. CT of the chest was performed without significant abnormalities. Will defer to primary care doctor for further evaluation. Will be available as needed. No further recommendations from cardiac standpoint.     Cath this admission with low normal LV function with minimal apical hypokinesis. Less than 50% diffuse LAD stents from 2001. No significant changes from 2016 study. CTA without significant abnormalities. Would continue possible GERD treatment and pain management per primary team.  Stable from cardiac. No new cardiac recommendations.     Subjective:     Still with chest pain but improved    Objective:      Patient Vitals for the past 8 hrs:   Temp Pulse Resp BP SpO2   01/16/20 0731 97.6 °F (36.4 °C) 78 20 117/68 98 %         Patient Vitals for the past 96 hrs:   Weight   01/16/20 0535 72.9 kg (160 lb 11.2 oz)   01/15/20 0906 72.1 kg (159 lb)   01/15/20 0521 72.3 kg (159 lb 4.8 oz)   01/14/20 1543 73.5 kg (162 lb)                    Intake/Output Summary (Last 24 hours) at 1/16/2020 1158  Last data filed at 1/16/2020 0858  Gross per 24 hour   Intake 480 ml   Output    Net 480 ml       Physical Exam:  General:  alert, cooperative, no distress, appears stated age  Neck:  no JVD  Lungs:  clear to auscultation bilaterally  Heart:  regular rate and rhythm  Abdomen:  abdomen is soft without significant tenderness, masses, organomegaly or guarding   Extremities:  extremities normal, atraumatic, no cyanosis or edema    Data Review:     Labs: Results:       Chemistry Recent Labs     01/16/20  0530 01/15/20  0530 01/14/20  1551   * 100* 111*    144 143   K 4.3 3.9 3.9    114* 110   CO2 27 26 28   BUN 11 14 17   CREA 0.72 0.70 0.79   CA 9.1 8.5 9.4   AGAP 5 4 5   BUCR 15 20 22*   AP  --   --  160*   TP  --   --  7.2   ALB  --   --  3.9   GLOB  --   --  3.3   AGRAT  --   --  1.2      CBC w/Diff Recent Labs     01/16/20  0530 01/15/20  0530 01/14/20  1551   WBC 8.0 5.8 9.4   RBC 4.16* 3.85* 4.50   HGB 12.3 11.5* 13.4   HCT 37.0 34.2* 40.2    213 261   GRANS 66 48 62   LYMPH 21 37 27   EOS 3 4 3      Cardiac Enzymes No results found for: CPK, CK, CKMMB, CKMB, RCK3, CKMBT, CKNDX, CKND1, JOSEFA, TROPT, TROIQ, AP, TROPT, TNIPOC, BNP, BNPP   Coagulation Recent Labs     01/14/20  1551   PTP 12.1   INR 0.9       Lipid Panel Lab Results   Component Value Date/Time    Cholesterol, total 146 10/06/2011 01:15 AM    HDL Cholesterol 64 (H) 10/06/2011 01:15 AM    LDL, calculated 58.4 10/06/2011 01:15 AM    VLDL, calculated 23.6 10/06/2011 01:15 AM    Triglyceride 118 10/06/2011 01:15 AM    CHOL/HDL Ratio 2.3 10/06/2011 01:15 AM      BNP No results found for: BNP, BNPP, XBNPT   Liver Enzymes Recent Labs     01/14/20  1551   TP 7.2   ALB 3.9   *   SGOT 38      Digoxin    Thyroid Studies Lab Results   Component Value Date/Time    TSH 11.37 (H) 10/06/2011 01:15 AM          Signed By: HAYES Soler     January 16, 2020

## 2020-01-16 NOTE — ROUTINE PROCESS
Bedside and Verbal shift change report given to [address] (oncoming nurse) by Malka Crawford RN (offgoing nurse). Report included the following information SBAR, Kardex, Intake/Output, MAR, Recent Results and Cardiac Rhythm NSR.

## 2020-01-16 NOTE — DISCHARGE INSTRUCTIONS
Discharge Instructions    Patient: Adali Rossi MRN: [id number]  CSN: [id number]    YOB: 1960  Age: 63 y.o. Sex: female    DOA: 1/14/2020 LOS:  LOS: 1 day   Discharge Date:      ACUTE DIAGNOSES:  Atypical chest pain, likely GERD   Coronary artery disease s/p cardiac catheterization without obstruction         DISCHARGE MEDICATIONS:       · It is important that you take the medication exactly as they are prescribed. · Keep your medication in the bottles provided by the pharmacist and keep a list of the medication names, dosages, and times to be taken in your wallet. · Do not take other medications without consulting your doctor. DIET:  Cardiac Diet and Low fat, Low cholesterol,  Please avoid high acidic food including onion, tomatoes, citric fruits    ACTIVITY: Activity as tolerated    ADDITIONAL INFORMATION: If you experience any of the following symptoms then please call your primary care physician or return to the emergency room if you cannot get hold of your doctor: Fever, chills, nausea, vomiting, diarrhea, change in mentation, falling, bleeding, shortness of breath. FOLLOW UP CARE:  Dr. Louis Florence MD  you are to call and set up an appointment to see them in 4-7 days. Follow-up with Dr. Barby Solano, cardiologist in 2-4 weeks       Information obtained by :  I understand that if any problems occur once I am at home I am to contact my physician. I understand and acknowledge receipt of the instructions indicated above.                                                                                                                                            Physician's or R.N.'s Signature                                                                  Date/Time                                                                                                                                              Patient or Representative Signature Date/Time    Panda Barreto MD  1/16/2020  11:48 AM

## 2020-01-16 NOTE — PROGRESS NOTES
Reason for Admission:  Chest pain [R07.9]  Atypical chest pain [R07.89]                 RRAT Score:    8            Plan for utilizing home health:    no                      Likelihood of Readmission:   LOW                         Transition of Care Plan:              Initial assessment completed with patient. Cognitive status of patient: oriented to time, place, person and situation. Face sheet information confirmed:  yes. The patient designates spouse to participate in her discharge plan and to receive any needed information. This patient lives in a single family home with patient and . Patient is able to navigate steps as needed. Prior to hospitalization, patient was considered to be independent with ADLs/IADLS : yes . Patient has a current ACP document on file: no  The patient and  will be available to transport patient home upon discharge. The patient already has none reported, medical equipment available in the home. Patient is not currently active with home health. Patient has not stayed in a skilled nursing facility or rehab. .      This patient is on dialysis :no     Freedom of choice signed: no. Currently, the discharge plan is Home. The patient states that she can obtain her medications from the pharmacy, and take her medications as directed. Patient's current insurance is [employer]       Care Management Interventions  PCP Verified by CM:  Yes  Mode of Transport at Discharge: Self  Current Support Network: Lives with Spouse  Confirm Follow Up Transport: Family  The Plan for Transition of Care is Related to the Following Treatment Goals : home  Discharge Location  Discharge Placement: Home        Kasia Boogie RN BSN  Care Manager  [phone]

## 2020-01-16 NOTE — DISCHARGE SUMMARY
Discharge Summary    Patient: Paige Baez               Sex: female          DOA: 1/14/2020         YOB: 1960      Age:  63 y.o.        LOS:  LOS: 1 day                Admit Date: 1/14/2020    Discharge Date: 1/16/2020    Primary care physician: Waldemar Mason MD    Discharge Diagnoses:    1. Atypical chest pain, likely GERD       Negative CTA finding  2. Coronary artery disease s/p cardiac catheterization without obstruction     Discharge Condition: Good  Disposition: home   Code Status: full code     Follow up for Primary Care Physician:  1) patient has been started on sucralfate and omeprazole for suspected GERD/gastritis, please monitor   2) she has temporary narcotic for pain control, she does not it more than 3 days  3) please evaluate for anxiety/depression and treat if clinically indicated. Hospital Course:   63 y.o female with HTN, CAD with stent, dyslipidemia, migraine presented with chest pain for 6 days. In the ER, cardiac enzyme was negative. She declined to take nitroglycerin due to headache. She requested narcotic for pain control which she received fentanyl. She underwent cardiac cath with Dr. Winter Colon which showed negative obstructive disease. He chest pain persisted. She did have CTA chest/abd/pelv in ER with was negative for pathology. Hence she was started on GERD/gastritis treatment PPI and sucralfate. She has evidence of cobblestone back of her tongue. Dietary educated provided. She was sent home with narcotic for 3 days and to follow up with her PCP. Last 24 Hours: no overnight event. She tolerated PPI but still has pain. She required narcotic at discharge and expressed that her main will be not be controlled by percocet. She was tried on sucralfate. Cardiology has no further intervention. Amlodipine was added.      ROS: no fever/chills, no headache, no dizziness, no facial pain, no sinus congestion,   No swallowing pain, + chest pain, no palpitation, no shortness of breath, no abd pain,  No diarrhea, no urinary complaint, no leg pain or swelling    VS:   Visit Vitals  /68 (BP 1 Location: Right arm, BP Patient Position: At rest)   Pulse 78   Temp 97.6 °F (36.4 °C)   Resp 20   Ht 5' 3\" (1.6 m)   Wt 72.9 kg (160 lb 11.2 oz)   SpO2 98%   BMI 28.47 kg/m²      Tmax/24hrs: Temp (24hrs), Av.7 °F (36.5 °C), Min:97.1 °F (36.2 °C), Max:98.8 °F (37.1 °C)      Intake/Output Summary (Last 24 hours) at 2020 1149  Last data filed at 2020 0858  Gross per 24 hour   Intake 480 ml   Output    Net 480 ml       Tele: sinus  General:  Cooperative, Not in acute distress, speaks in full sentence while in bed  HEENT: PERRL, EOMI, supple neck, no JVD, dry oral mucosa  Cardiovascular: S1S2 regular, no rub/gallop   Pulmonary: Clear air entry bilaterally, no wheezing, no crackle  GI:  Soft, non tender, non distended, +bs, no guarding   Extremities:  No pedal edema, +distal pulses appreciated   Neuro: AOx3, moving all extremities, no gross deficit. Consults:   Cardiology: Dr. Willard Hilton    Significant Diagnostic Studies:  CT Results (most recent):  Results from Hospital Encounter encounter on 20   CTA CHEST ABD PELV W CONT    Narrative CT angiogram of the chest, abdomen and pelvis    History: Midsternal chest pain with waxing and waning severity for last several  days is now constant without relief. No radiation. No nausea, vomiting or  shortness of breath at this time. Technique: Multidetector helical CT angiography was carried out from the  thoracic inlet to the lesser trochanters following intravenous contrast  administration. Thin section image collimation was utilized and 3-D images were  postprocessed on an [address] [place] [address]. Parenchymal organ imaging  will be limited by arterial phase of contrast administration.     CT scans at this facility are performed using dose optimization technique as  appropriate to a performed exam, to include automated exposure control,  adjustment of the mA and/or kV according to patient size (including appropriate  matching for site specific examinations), or use of iterative reconstruction  technique. Comparison: Chest radiograph performed the same day. CT abdomen/pelvis of  October 2011. CT scan thorax:    Pleura: No pleural effusion or pneumothorax. Heart: Mild cardiomegaly. Subendocardial fat deposition noted within the left  ventricle 6, sequela of previous ischemia. Coronary artery atherosclerosis. LAD  stent placement. Lymph nodes: Very small nonspecific mediastinal lymph nodes noted. Pulmonary vessels: Not enlarged. No filling defects identified given limitations  of technique. Pulmonary parenchyma: Trace dependent bibasilar subsegmental atelectasis. CT scan of the abdomen:    Liver: Unremarkable    Spleen: Unremarkable    Pancreas: Unremarkable    Adrenal glands: Unremarkable    Gallbladder: Surgically absent    Retroperitoneum: No suspect mass    Kidneys: Symmetric enhancement. No hydronephrosis. Near complete and/or complete  duplication of the right renal collecting system. Distal ureters not really  delineated. Bowel and mesentery: Underdistended which limits evaluation. Thickening of the  descending and sigmoid colon not excluded. Focal thickening of the splenic  flexure not excluded. Moderate colonic fecal load. Appendectomy. Colonic  diverticulosis. No small bowel dilatation or air-fluid levels. No  pneumoperitoneum. Question mild thickening proximal esophagus. Osseous degenerative changes. Rotoscoliosis. CT scan pelvis:    Urinary bladder: Unremarkable    Bowel: Diverticulosis. Under distended. Thickening not excluded. Lymph nodes: No enlarged lymph nodes. Uterus surgically absent. CT angiography:    No intramural hematoma or aortic dissection. Small focal aortic outpouching near  the presumed remnant of ductus arteriosus. Single renal arteries.  Atherosclerosis noted at the left arterial origin causing  mild-to-moderate narrowing. SMA and celiac patent. SAE patent. Common iliacs  patent. Mild atherosclerosis noted throughout. Atherosclerosis noted at the  origin of the celiac without tearing. Atherosclerosis noted at the origin of the  right renal artery causing minimal to mild narrowing. Impression Impression:    1. No dissection, intramural hematoma or pulmonary embolus. 2. Focal outpouching at the expected previous location of ductus arteriosus. 3. Cholecystectomy, hysterectomy and appendectomy. 4. Atherosclerosis. There is some narrowing of the renal artery origins  bilaterally, left greater than right. 5. Status post LAD stent placement with left ventricular findings of previous  myocardial ischemia. 6. Please see report of multiple additional findings and further details. 01/14/20   CARDIAC PROCEDURE 01/16/2020 1/16/2020    Narrative · Low normal LV function with minimal apical hypokinesis. · Less than 50% diffuse LAD stents from 2001. · No significant changes from 2016 study. · No new cardiac recommendations. Signed by: Toni Cook MD         Lab/Data Review:  Labs: Results:       Chemistry Recent Labs     01/16/20  0530 01/15/20  0530 01/14/20  1551   * 100* 111*    144 143   K 4.3 3.9 3.9    114* 110   CO2 27 26 28   BUN 11 14 17   CREA 0.72 0.70 0.79   CA 9.1 8.5 9.4   AGAP 5 4 5   BUCR 15 20 22*   AP  --   --  160*   TP  --   --  7.2   ALB  --   --  3.9   GLOB  --   --  3.3   AGRAT  --   --  1.2      CBC w/Diff Recent Labs     01/16/20  0530 01/15/20  0530 01/14/20  1551   WBC 8.0 5.8 9.4   RBC 4.16* 3.85* 4.50   HGB 12.3 11.5* 13.4   HCT 37.0 34.2* 40.2    213 261   GRANS 66 48 62   LYMPH 21 37 27   EOS 3 4 3      Coagulation Recent Labs     01/14/20  1551   PTP 12.1   INR 0.9       Iron/Ferritin No results for input(s): IRON in the last 72 hours.     No lab exists for component: TIBCCALC   BNP No results for input(s): BNPP in the last 72 hours. Cardiac Enzymes Recent Labs     01/14/20  2100 01/14/20  1551   CPK 58 73   CKND1 CALCULATION NOT PERFORMED WHEN RESULT IS BELOW LINEAR LIMIT CALCULATION NOT PERFORMED WHEN RESULT IS BELOW LINEAR LIMIT      Liver Enzymes Recent Labs     01/14/20  1551   TP 7.2   ALB 3.9   *   SGOT 38      Thyroid Studies No results for input(s): T4, T3U, TSH, TSHEXT in the last 72 hours. No lab exists for component: T3RU       All Micro Results     None                Medications at discharge  including reasons for change and indications for new ones:   Current Discharge Medication List      START taking these medications    Details   amLODIPine (NORVASC) 5 mg tablet Take 1 Tab by mouth nightly for 90 days. Indications: prevention of anginal pain associated with vasospastic angina  Qty: 30 Tab, Refills: 2      omeprazole (PRILOSEC) 40 mg capsule Take 1 Cap by mouth ACB/HS for 30 days. Indications: gastroesophageal reflux disease  Qty: 60 Cap, Refills: 0      sucralfate (CARAFATE) 1 gram tablet Take 1 Tab by mouth Before breakfast, lunch, dinner and at bedtime for 7 days. Indications: gastroesophageal reflux disease  Qty: 28 Tab, Refills: 0      HYDROmorphone (DILAUDID) 2 mg tablet Take 1 Tab by mouth every six (6) hours as needed for Pain for up to 3 days. Max Daily Amount: 8 mg. Indications: excessive pain  Qty: 12 Tab, Refills: 0    Associated Diagnoses: Atypical chest pain         CONTINUE these medications which have NOT CHANGED    Details   clopidogrel (PLAVIX) 75 mg tab Take 1 Tab by mouth daily. Qty: 90 Tab, Refills: 3    Associated Diagnoses: Dyslipidemia      rosuvastatin (CRESTOR) 40 mg tablet Take 1 Tab by mouth nightly. Qty: 90 Tab, Refills: 3      aspirin delayed-release 81 mg tablet Take 1 Tab by mouth daily. Qty: 1 Tab, Refills: 0      levothyroxine (SYNTHROID) 75 mcg tablet Take 75 mcg by mouth Daily (before breakfast).       zolpidem (AMBIEN) 10 mg tablet Take 10 mg by mouth nightly as needed. lisinopril (PRINIVIL, ZESTRIL) 10 mg tablet Take 10 mg by mouth daily. butalbital-acetaminophen-caffeine -40 mg lollipop 400 mcg by Buccal route three (3) times daily as needed.          STOP taking these medications       nitroglycerin (NITROSTAT) 0.4 mg SL tablet Comments:   Reason for Stopping:                       Pending laboratory work and tests: none    Activity: Activity as tolerated    Diet: Cardiac Diet and Low fat, Low cholesterol  Avoid high acidic food, including onion, tomatoes, citric, alcohol     Wound Care: None needed        Rivera Pérez MD  1/16/2020  11:49 AM

## 2020-05-26 NOTE — PROGRESS NOTES
Nella Harrison presents today for an overdue post-hospital follow-up. She was hospitalized from 1/14/20 through 1/16/20 for atypical chest pain. She underwent cardiac catheterization on 1/15/20 which showed:    Prox LAD to Mid LAD lesion 45% stenosed. . The lesion was previously treated using a stent (unknown type). Dist LAD lesion 35% stenosed. . The lesion was previously treated using a stent (unknown type). There was no significant change from the cath done in 2016. She also had an echo done during that hospitalization and it showed normal cavity size and wall thickness, moderate systolic dysfunction, estimated left ventricular ejection fraction is 35 - 40%, and abnormal left ventricular wall motion. She states that she has been feeling well since being discharged. She has not experienced anymore chest pain,  Denies chest pain, tightness, heaviness, and palpitations. Denies shortness of breath at rest, dyspnea on exertion, orthopnea and PND. Denies abdominal bloating. Denies lightheadedness, dizziness, and syncope. Denies lower extremity edema and claudication. Denies nausea, vomiting, diarrhea, melena, hematochezia. Denies hematuria, urgency, frequency. Denies fever, chills. PMH:  Past Medical History:   Diagnosis Date    Abnormal nuclear cardiac imaging test 07/16/2014    Sm-mod apical infarction. No ischemia. EF 60%. Mod apical, apical septal hypk. Neg EKG on max EST. Ex time 6 min.  CAD (coronary artery disease), native coronary artery     Dyslipidemia     Echocardiogram 09/08/2010    EF 50-55%. Mild, diffuse hypk. Mild mid-apical , anterior, apical septal hypk.  Essential hypertension, benign     Headache(784.0)     migraine    History of tobacco use     none since 2001    Hypercholesterolemia     Lower extremity venous duplex 10/05/2011    No evidence of DVT or superficial thrombosis bilaterally.     MI (myocardial infarction) ([facility] [address] 2001; 3/2008    s/p anterior wall MI, 2001; non-ST elevation MI, 2008    Musculoskeletal pain     Other chest pain     S/P cardiac cath 09/10/2010    RI (sm) 90% ostial.  LAD 30% in-stent restenosis. Otherwise patent coronaries. LVEDP 19. EF 60%.  Thyroid disease        PSH:  Past Surgical History:   Procedure Laterality Date    CARDIAC CATHETERIZATION  10/20/2016         CARDIAC SURG PROCEDURE UNLIST      stent placement     CORONARY ARTERY ANGIOGRAM  10/20/2016         HX APPENDECTOMY      1999    HX CHOLECYSTECTOMY      2005    HX CORONARY STENT PLACEMENT  2001; 2003; 2008    HX GYN      complete hysterectomy 1996    LV ANGIOGRAPHY  10/20/2016            MEDS:  Current Outpatient Medications   Medication Sig    nitroglycerin (NITROSTAT) 0.4 mg SL tablet 1 Tab by SubLINGual route every five (5) minutes as needed for Chest Pain. Up to 3 doses.  clopidogrel (PLAVIX) 75 mg tab Take 1 Tab by mouth daily.  rosuvastatin (CRESTOR) 40 mg tablet Take 1 Tab by mouth nightly.  aspirin delayed-release 81 mg tablet Take 1 Tab by mouth daily.  levothyroxine (SYNTHROID) 75 mcg tablet Take 75 mcg by mouth Daily (before breakfast).  butalbital-acetaminophen-caffeine -40 mg lollipop 400 mcg by Buccal route three (3) times daily as needed.  zolpidem (AMBIEN) 10 mg tablet Take 10 mg by mouth nightly as needed.  lisinopril (PRINIVIL, ZESTRIL) 10 mg tablet Take 10 mg by mouth daily. No current facility-administered medications for this visit. Allergies and Sensitivities:  Allergies   Allergen Reactions    Nitroglycerin Other (comments)     Could not tolerate long-acting nitroglycerin due to her migraine headaches.     Toprol Xl [Metoprolol Succinate] Other (comments)     Could not tolerate b/c of her migraine headaches       Family History:  Family History   Problem Relation Age of Onset    Heart Disease Father     Heart Disease Maternal Grandfather        Social History:  She  reports that she quit smoking about 19 years ago. She has a 10.00 pack-year smoking history. She has never used smokeless tobacco.  She  reports current alcohol use of about 1.0 standard drinks of alcohol per week. Physical:  Visit Vitals  /82   Pulse 75   Ht 5' 3\" (1.6 m)   Wt 75.8 kg (167 lb)   SpO2 98%   BMI 29.58 kg/m²         Exam:  Neck:  Supple, no JVD, no carotid bruits  CV:  Normal S1 and  S2, no murmurs, rubs, or gallops noted  Lungs:  Clear to ausculation throughout, no wheezes or rales  Abd:  Soft, non-tender, non-distended with good bowel sounds. No hepatosplenomegaly  Extremities:  No edema      Data:  EKG:      LABS:  Lab Results   Component Value Date/Time    Sodium 143 01/16/2020 05:30 AM    Potassium 4.3 01/16/2020 05:30 AM    Chloride 111 01/16/2020 05:30 AM    CO2 27 01/16/2020 05:30 AM    Glucose 106 (H) 01/16/2020 05:30 AM    BUN 11 01/16/2020 05:30 AM    Creatinine 0.72 01/16/2020 05:30 AM     Lab Results   Component Value Date/Time    Cholesterol, total 146 10/06/2011 01:15 AM    HDL Cholesterol 64 (H) 10/06/2011 01:15 AM    LDL, calculated 58.4 10/06/2011 01:15 AM    Triglyceride 118 10/06/2011 01:15 AM    CHOL/HDL Ratio 2.3 10/06/2011 01:15 AM     Lab Results   Component Value Date/Time    ALT (SGPT) 57 (H) 01/14/2020 03:51 PM         Impression/Plan:  1.  CAD, history of MI's and PCI/stenting, recent cath showed 45% and 35% ISR in the LAD  2. Dyslipidemia, on rosuvastatin 40mg  3. Essential hypertension, blood pressure stable      Ms. Remberto Franklin was seen today for an overdue post-hospital follow-up. She has been feeling well and has not had any chest pain since being discharged home. She was tearful as we discussed the findings of the cardiac catheterization and echocardiogram.  She does not recall the findings being discussed with her. She has positive family history for CAD (a couple of uncles having MI's and dying in their 42's).   She states that she wishes that she knew about how to help prevent CAD when she was younger and maybe her onset would have occurred later in her life and not at the age of 45. That being said, secondary prevention is now in place and she was encouraged to continue taking her medications as prescribed, continue with follow-up, be aware of signs/symptoms to report, adhere to low sodium/heart healthy diet, avoid smoking, and limit alcohol intake (if she drinks). She is on appropriate therapy for CAD and medication teaching was done today. Greater than 50% of a 40 minute visit was spent in discussion, counseling, and answering questions. She will follow-up with Dr. Rafy Elder as scheduled and as needed. Chaim Spivey MSN, FNP-BC    Please note:  Portions of this chart were created with Dragon medical speech to text program.  Unrecognized errors may be present.

## 2020-06-01 ENCOUNTER — OFFICE VISIT (OUTPATIENT)
Dept: CARDIOLOGY CLINIC | Age: 60
End: 2020-06-01

## 2020-06-01 VITALS
SYSTOLIC BLOOD PRESSURE: 142 MMHG | DIASTOLIC BLOOD PRESSURE: 82 MMHG | BODY MASS INDEX: 29.59 KG/M2 | OXYGEN SATURATION: 98 % | HEART RATE: 75 BPM | WEIGHT: 167 LBS | HEIGHT: 63 IN

## 2020-06-01 DIAGNOSIS — I25.10 CORONARY ARTERY DISEASE INVOLVING NATIVE CORONARY ARTERY OF NATIVE HEART WITHOUT ANGINA PECTORIS: Primary | ICD-10-CM

## 2020-06-01 RX ORDER — NITROGLYCERIN 0.4 MG/1
0.4 TABLET SUBLINGUAL
Qty: 1 TAB | Refills: 0
Start: 2020-06-01 | End: 2022-03-09 | Stop reason: SDUPTHER

## 2020-06-01 NOTE — PROGRESS NOTES
Coretha Agent presents today for   Chief Complaint   Patient presents with    Coronary Artery Disease     no cardiac complaints       Coretha Agent preferred language for health care discussion is english/other. Is someone accompanying this pt? no    Is the patient using any DME equipment during OV? no    Depression Screening:  No flowsheet data found. Learning Assessment:  Learning Assessment 6/20/2016   PRIMARY LEARNER Patient   HIGHEST LEVEL OF EDUCATION - PRIMARY LEARNER  -   BARRIERS PRIMARY LEARNER -   CO-LEARNER CAREGIVER -   PRIMARY LANGUAGE ENGLISH   LEARNER PREFERENCE PRIMARY DEMONSTRATION   ANSWERED BY Patient   RELATIONSHIP SELF       Abuse Screening:  No flowsheet data found. Fall Risk  No flowsheet data found. Pt currently taking Anticoagulant therapy? no    Coordination of Care:  1. Have you been to the ER, urgent care clinic since your last visit? Hospitalized since your last visit? no    2. Have you seen or consulted any other health care providers outside of the [address] [place] [address] since your last visit? Include any pap smears or colon screening.  no

## 2020-08-05 RX ORDER — ROSUVASTATIN CALCIUM 40 MG/1
TABLET, COATED ORAL
Qty: 90 TAB | Refills: 3 | Status: SHIPPED | OUTPATIENT
Start: 2020-08-05 | End: 2021-07-27

## 2020-12-09 DIAGNOSIS — E78.5 DYSLIPIDEMIA: ICD-10-CM

## 2020-12-09 RX ORDER — CLOPIDOGREL BISULFATE 75 MG/1
75 TABLET ORAL DAILY
Qty: 90 TAB | Refills: 3 | Status: SHIPPED | OUTPATIENT
Start: 2020-12-09 | End: 2021-12-03

## 2021-03-02 ENCOUNTER — OFFICE VISIT (OUTPATIENT)
Dept: CARDIOLOGY CLINIC | Age: 61
End: 2021-03-02
Payer: MEDICARE

## 2021-03-02 VITALS
BODY MASS INDEX: 30.65 KG/M2 | SYSTOLIC BLOOD PRESSURE: 200 MMHG | HEART RATE: 86 BPM | OXYGEN SATURATION: 98 % | DIASTOLIC BLOOD PRESSURE: 110 MMHG | HEIGHT: 63 IN | WEIGHT: 173 LBS

## 2021-03-02 DIAGNOSIS — I25.10 CORONARY ARTERY DISEASE INVOLVING NATIVE CORONARY ARTERY OF NATIVE HEART WITHOUT ANGINA PECTORIS: ICD-10-CM

## 2021-03-02 DIAGNOSIS — E78.5 DYSLIPIDEMIA: ICD-10-CM

## 2021-03-02 DIAGNOSIS — R07.9 CHEST PAIN, UNSPECIFIED TYPE: ICD-10-CM

## 2021-03-02 DIAGNOSIS — R07.9 CHEST PAIN, UNSPECIFIED TYPE: Primary | ICD-10-CM

## 2021-03-02 PROCEDURE — 99214 OFFICE O/P EST MOD 30 MIN: CPT | Performed by: INTERNAL MEDICINE

## 2021-03-02 PROCEDURE — 93000 ELECTROCARDIOGRAM COMPLETE: CPT | Performed by: INTERNAL MEDICINE

## 2021-03-02 PROCEDURE — G8755 DIAS BP > OR = 90: HCPCS | Performed by: INTERNAL MEDICINE

## 2021-03-02 PROCEDURE — G8427 DOCREV CUR MEDS BY ELIG CLIN: HCPCS | Performed by: INTERNAL MEDICINE

## 2021-03-02 PROCEDURE — 3017F COLORECTAL CA SCREEN DOC REV: CPT | Performed by: INTERNAL MEDICINE

## 2021-03-02 PROCEDURE — G8510 SCR DEP NEG, NO PLAN REQD: HCPCS | Performed by: INTERNAL MEDICINE

## 2021-03-02 PROCEDURE — G9899 SCRN MAM PERF RSLTS DOC: HCPCS | Performed by: INTERNAL MEDICINE

## 2021-03-02 PROCEDURE — G8417 CALC BMI ABV UP PARAM F/U: HCPCS | Performed by: INTERNAL MEDICINE

## 2021-03-02 PROCEDURE — G8753 SYS BP > OR = 140: HCPCS | Performed by: INTERNAL MEDICINE

## 2021-03-02 RX ORDER — METOPROLOL SUCCINATE 50 MG/1
50 TABLET, EXTENDED RELEASE ORAL DAILY
Qty: 30 TAB | Refills: 6 | Status: SHIPPED | OUTPATIENT
Start: 2021-03-02

## 2021-03-02 NOTE — PROGRESS NOTES
HISTORY OF PRESENT ILLNESS  Uri Arroyo is a 62 y.o. female. ASSESSMENT and PLAN    Ms. Andrés Duong has history of CAD. She had initial anterolateral MI back in 2001 with stents. She had repeat distal LAD stent performed back in 2008 when she presented with anterior non-STEMI. Because of persistent chest pains, she underwent repeat coronary angiography in 2010 which revealed ostial ramus 90% stenosis which has done well on medical therapy. She also has dyslipidemia and hypertension. With careful dieting, she is trying to maintain her weight at about 155 pounds.  CAD:   Clinically stable.  BP:    Elevated. Will initiate Toprol-XL 50 mg daily. If she cannot tolerate this due to migraine headaches, will initiate Imdur 60 mg daily. She will come back and see my nurse practitioner in 1-2 weeks for further blood pressure check.  Rhythm:    Stable sinus rhythm.  CHF:    There is no evidence of decompensated CHF noted. Her weight today is 173 pounds. She has put on another 10 pounds since her last visit. Her baseline weight is 155 pounds. With this was back in 2019.  Weight:       Cholesterol:  Target LDL<70. Crestor 40.  Anticoagulant:  Remains on ASA, and Plavix. I will see her back annually. Thank you. Encounter Diagnoses   Name Primary?  Chest pain, unspecified type Yes    Dyslipidemia     Coronary artery disease involving native coronary artery of native heart without angina pectoris      current treatment plan is effective, no change in therapy  lab results and schedule of future lab studies reviewed with patient  reviewed diet, exercise and weight control  cardiovascular risk and specific lipid/LDL goals reviewed  use of aspirin to prevent MI and TIA's discussed      HPI   Today, Ms. Elaina Pedro has no complaints of chest pains, increased shortness of breath or decreased exercise capacity. She denies any worsening dyspnea on exertion. She denies orthopnea or PND.   She denies any palpitations or dizziness. Review of Systems   Respiratory: Negative for shortness of breath. Cardiovascular: Positive for chest pain. Negative for palpitations, orthopnea, claudication, leg swelling and PND. All other systems reviewed and are negative. Physical Exam  Vitals signs and nursing note reviewed. Constitutional:       Appearance: Normal appearance. HENT:      Head: Normocephalic. Eyes:      Conjunctiva/sclera: Conjunctivae normal.   Neck:      Musculoskeletal: No neck rigidity. Cardiovascular:      Rate and Rhythm: Normal rate and regular rhythm. Pulmonary:      Breath sounds: Normal breath sounds. Abdominal:      Palpations: Abdomen is soft. Musculoskeletal:         General: No swelling. Skin:     General: Skin is warm and dry. Neurological:      General: No focal deficit present. Mental Status: She is alert and oriented to person, place, and time. Psychiatric:         Mood and Affect: Mood normal.         Behavior: Behavior normal.         PCP: Silke Sanchez MD    Past Medical History:   Diagnosis Date    Abnormal nuclear cardiac imaging test 07/16/2014    Sm-mod apical infarction. No ischemia. EF 60%. Mod apical, apical septal hypk. Neg EKG on max EST. Ex time 6 min.  CAD (coronary artery disease), native coronary artery     Dyslipidemia     Echocardiogram 09/08/2010    EF 50-55%. Mild, diffuse hypk. Mild mid-apical , anterior, apical septal hypk.  Essential hypertension, benign     Headache(784.0)     migraine    History of tobacco use     none since 2001    Hypercholesterolemia     Lower extremity venous duplex 10/05/2011    No evidence of DVT or superficial thrombosis bilaterally.  MI (myocardial infarction) ([address] 2001; 3/2008    s/p anterior wall MI, 2001; non-ST elevation MI, 2008    Musculoskeletal pain     Other chest pain     S/P cardiac cath 09/10/2010    RI (sm) 90% ostial.  LAD 30% in-stent restenosis. Otherwise patent coronaries. LVEDP 19. EF 60%.  Thyroid disease        Past Surgical History:   Procedure Laterality Date    CARDIAC CATHETERIZATION  10/20/2016         CORONARY ARTERY ANGIOGRAM  10/20/2016         HX APPENDECTOMY          HX CHOLECYSTECTOMY          HX CORONARY STENT PLACEMENT  ; ;     HX GYN      complete hysterectomy     LV ANGIOGRAPHY  10/20/2016         [place] CARDIAC SURG PROCEDURE UNLIST      stent placement        Current Outpatient Medications   Medication Sig Dispense Refill    clopidogreL (PLAVIX) 75 mg tab Take 1 Tab by mouth daily. 90 Tab 3    rosuvastatin (CRESTOR) 40 mg tablet TAKE 1 TABLET BY MOUTH EVERY NIGHT 90 Tab 3    nitroglycerin (NITROSTAT) 0.4 mg SL tablet 1 Tab by SubLINGual route every five (5) minutes as needed for Chest Pain. Up to 3 doses. 1 Tab 0    aspirin delayed-release 81 mg tablet Take 1 Tab by mouth daily. 1 Tab 0    levothyroxine (SYNTHROID) 75 mcg tablet Take 75 mcg by mouth Daily (before breakfast).  butalbital-acetaminophen-caffeine -40 mg lollipop 400 mcg by Buccal route three (3) times daily as needed.  zolpidem (AMBIEN) 10 mg tablet Take 10 mg by mouth nightly as needed.  lisinopril (PRINIVIL, ZESTRIL) 10 mg tablet Take 10 mg by mouth daily. The patient has a family history of    Social History     Tobacco Use    Smoking status: Former Smoker     Packs/day: 1.00     Years: 10.00     Pack years: 10.00     Quit date: 10/5/2000     Years since quittin.4    Smokeless tobacco: Never Used   Substance Use Topics    Alcohol use:  Yes     Alcohol/week: 1.0 standard drinks     Types: 1 Glasses of wine per week     Comment: occasional alcohol use    Drug use: No       Lab Results   Component Value Date/Time    Cholesterol, total 146 10/06/2011 01:15 AM    HDL Cholesterol 64 (H) 10/06/2011 01:15 AM    LDL, calculated 58.4 10/06/2011 01:15 AM    Triglyceride 118 10/06/2011 01:15 AM    CHOL/HDL Ratio 2.3 10/06/2011 01:15 AM        BP Readings from Last 3 Encounters:   03/02/21 (!) 226/110   06/01/20 142/82   01/16/20 117/68        Pulse Readings from Last 3 Encounters:   03/02/21 86   06/01/20 75   01/16/20 78       Wt Readings from Last 3 Encounters:   03/02/21 78.5 kg (173 lb)   06/01/20 75.8 kg (167 lb)   01/16/20 72.9 kg (160 lb 11.2 oz)         EKG: unchanged from previous tracings, normal sinus rhythm, Q waves in V1 and V2.

## 2021-03-02 NOTE — PROGRESS NOTES
Lorrie Carrero presents today for   Chief Complaint   Patient presents with    Follow-up     over due follow up        Lorrie Carrero preferred language for health care discussion is english/other. Is someone accompanying this pt? no    Is the patient using any DME equipment during OV? no    Depression Screening:  No flowsheet data found. Learning Assessment:  Learning Assessment 6/20/2016   PRIMARY LEARNER Patient   HIGHEST LEVEL OF EDUCATION - PRIMARY LEARNER  -   BARRIERS PRIMARY LEARNER -   CO-LEARNER CAREGIVER -   PRIMARY LANGUAGE ENGLISH   LEARNER PREFERENCE PRIMARY DEMONSTRATION   ANSWERED BY Patient   RELATIONSHIP SELF     Pt currently taking Anticoagulant therapy? ASA 81mg every day and Plavix     Coordination of Care:  1. Have you been to the ER, urgent care clinic since your last visit? Hospitalized since your last visit? 1/14/20 - 1/16/20 for atypical chest pain     2. Have you seen or consulted any other health care providers outside of the [address] [place] [address] since your last visit? Include any pap smears or colon screening.  no

## 2021-03-12 ENCOUNTER — OFFICE VISIT (OUTPATIENT)
Dept: CARDIOLOGY CLINIC | Age: 61
End: 2021-03-12
Payer: MEDICARE

## 2021-03-12 VITALS
SYSTOLIC BLOOD PRESSURE: 138 MMHG | HEART RATE: 83 BPM | BODY MASS INDEX: 29.77 KG/M2 | OXYGEN SATURATION: 99 % | DIASTOLIC BLOOD PRESSURE: 82 MMHG | HEIGHT: 63 IN | WEIGHT: 168 LBS

## 2021-03-12 DIAGNOSIS — I10 ESSENTIAL HYPERTENSION, BENIGN: Primary | ICD-10-CM

## 2021-03-12 PROCEDURE — 3017F COLORECTAL CA SCREEN DOC REV: CPT | Performed by: NURSE PRACTITIONER

## 2021-03-12 PROCEDURE — G8417 CALC BMI ABV UP PARAM F/U: HCPCS | Performed by: NURSE PRACTITIONER

## 2021-03-12 PROCEDURE — G8427 DOCREV CUR MEDS BY ELIG CLIN: HCPCS | Performed by: NURSE PRACTITIONER

## 2021-03-12 PROCEDURE — 99213 OFFICE O/P EST LOW 20 MIN: CPT | Performed by: NURSE PRACTITIONER

## 2021-03-12 PROCEDURE — G8754 DIAS BP LESS 90: HCPCS | Performed by: NURSE PRACTITIONER

## 2021-03-12 PROCEDURE — G8432 DEP SCR NOT DOC, RNG: HCPCS | Performed by: NURSE PRACTITIONER

## 2021-03-12 PROCEDURE — G9899 SCRN MAM PERF RSLTS DOC: HCPCS | Performed by: NURSE PRACTITIONER

## 2021-03-12 PROCEDURE — G8752 SYS BP LESS 140: HCPCS | Performed by: NURSE PRACTITIONER

## 2021-03-12 RX ORDER — BUTALBITAL, ASPIRIN AND CAFFEINE 50; 325; 40 MG/1; MG/1; MG/1
1 TABLET ORAL
COMMUNITY

## 2021-03-12 NOTE — PROGRESS NOTES
Bobby Stephens presents today for   Chief Complaint   Patient presents with    Follow-up     1 week f/u, BP check       Bobby Stephens preferred language for health care discussion is english/other. Is someone accompanying this pt? no    Is the patient using any DME equipment during [address]? no    Depression Screening:  3 most recent PHQ Screens 3/12/2021   Little interest or pleasure in doing things Not at all   Feeling down, depressed, irritable, or hopeless Not at all   Total Score PHQ 2 0       Learning Assessment:  Learning Assessment 3/12/2021   PRIMARY LEARNER Patient   HIGHEST LEVEL OF EDUCATION - PRIMARY LEARNER  -   BARRIERS PRIMARY LEARNER -   CO-LEARNER CAREGIVER -   PRIMARY LANGUAGE ENGLISH   LEARNER PREFERENCE PRIMARY DEMONSTRATION   ANSWERED BY patient   RELATIONSHIP SELF       Abuse Screening:  Abuse Screening Questionnaire 3/12/2021   Do you ever feel afraid of your partner? N   Are you in a relationship with someone who physically or mentally threatens you? N   Is it safe for you to go home? Y       Fall Risk  Fall Risk Assessment, last 12 mths 3/12/2021   Able to walk? Yes   Fall in past 12 months? 0   Do you feel unsteady? 0   Are you worried about falling 0       Pt currently taking Anticoagulant therapy? Plavix 75mg    Coordination of Care:  1. Have you been to the ER, urgent care clinic since your last visit? Hospitalized since your last visit? no    2. Have you seen or consulted any other health care providers outside of the [address] [place] [address] since your last visit? Include any pap smears or colon screening.  no

## 2021-03-12 NOTE — PATIENT INSTRUCTIONS
Plan: - Your blood pressure is well controlled on exam.  
 
- However I would like you to monitor your blood pressure for an additional 2 weeks to be certain your bps are controlled. - Check blood pressure 2-3 hours after BP medications are taken. Keep record of your blood pressures. - Be mindful it could take up to 10-14 days before an improvement of blood pressure is seen if medication adjustments were made. - Follow up in 2 weeks via telephone to report bp readings. - If your bp is consistently greater than 140s, will consider increasing your lisinopril.

## 2021-03-12 NOTE — PROGRESS NOTES
Chief Complaint : bp follow up. HPI:  Tim Colón is a 62 y.o. female with PMHx significant for coronary artery disease, hypertension, dyslipidemia, and migraines. She had initial anterolateral MI back in 2001 with stents. She had repeat distal LAD stent performed back in 2008 when she presented with anterior non-STEMI. Due to complaints of persistent chest pains, she underwent repeat coronary angiography in 2010 which revealed ostial ramus 90% stenosis which has done well on medical therapy. Patient was recently seen by Dr. Burnard Dakin 1 week ago. Due to elevated bp on exam and complaints of chest pain, she was started on Metoprolol. Patient reports she stopped taking metoprolol a few days later due to headaches and dizziness. Her bp is well controlled on exam, 130s/80s. Unfortunately she cannot recall her home bp readings, therefore I have asked her to monitor her bp for an additional 2 weeks to be certain her bps are well controlled on current dose of lisinopril 10mg. She continues to have chest discomfort. Patient informed dr. Burnard Dakin recommended starting imdur 60mg daily if she was not able to tolerate metoprolol but once side effects of headache was mentioned, patient declined to start imdur. She denies palpitations. Denies shortness of breath at rest, dyspnea on exertion, orthopnea and PND. Denies lightheadedness, dizziness, and syncope. Denies lower extremity edema and claudication. Past Medical History:  Past Medical History:   Diagnosis Date    Abnormal nuclear cardiac imaging test 07/16/2014    Sm-mod apical infarction. No ischemia. EF 60%. Mod apical, apical septal hypk. Neg EKG on max EST. Ex time 6 min.  CAD (coronary artery disease), native coronary artery     Dyslipidemia     Echocardiogram 09/08/2010    EF 50-55%. Mild, diffuse hypk. Mild mid-apical , anterior, apical septal hypk.       Essential hypertension, benign     Headache(784.0)     migraine    History of tobacco use     none since     Hypercholesterolemia     Lower extremity venous duplex 10/05/2011    No evidence of DVT or superficial thrombosis bilaterally.  MI (myocardial infarction) ([facility] [address] ; 3/2008    s/p anterior wall MI, ; non-ST elevation MI,     Musculoskeletal pain     Other chest pain     S/P cardiac cath 09/10/2010    RI (sm) 90% ostial.  LAD 30% in-stent restenosis. Otherwise patent coronaries. LVEDP 19. EF 60%.  Thyroid disease        Surgical History:  Past Surgical History:   Procedure Laterality Date    CARDIAC CATHETERIZATION  10/20/2016         CORONARY ARTERY ANGIOGRAM  10/20/2016         HX APPENDECTOMY          HX CHOLECYSTECTOMY          HX CORONARY STENT PLACEMENT  ; ;     HX GYN      complete hysterectomy     LV ANGIOGRAPHY  10/20/2016         IA CARDIAC SURG PROCEDURE UNLIST      stent placement         Social History:  Social History     Socioeconomic History    Marital status:      Spouse name: Not on file    Number of children: Not on file    Years of education: Not on file    Highest education level: Not on file   Occupational History    Not on file   Social Needs    Financial resource strain: Not on file    Food insecurity     Worry: Not on file     Inability: Not on file    Transportation needs     Medical: Not on file     Non-medical: Not on file   Tobacco Use    Smoking status: Former Smoker     Packs/day: 1.00     Years: 10.00     Pack years: 10.00     Quit date: 10/5/2000     Years since quittin.4    Smokeless tobacco: Never Used   Substance and Sexual Activity    Alcohol use:  Yes     Alcohol/week: 1.0 standard drinks     Types: 1 Glasses of wine per week     Comment: occasional alcohol use    Drug use: No    Sexual activity: Not on file   Lifestyle    Physical activity     Days per week: Not on file     Minutes per session: Not on file    Stress: Not on file   Relationships    Social connections     Talks on phone: Not on file     Gets together: Not on file     Attends Oriental orthodox service: Not on file     Active member of club or organization: Not on file     Attends meetings of clubs or organizations: Not on file     Relationship status: Not on file    Intimate partner violence     Fear of current or ex partner: Not on file     Emotionally abused: Not on file     Physically abused: Not on file     Forced sexual activity: Not on file   Other Topics Concern    Not on file   Social History Narrative    Not on file        Family History:  Family History   Problem Relation Age of Onset    Heart Disease Father     Heart Disease Maternal Grandfather         Allergies: Allergies   Allergen Reactions    Nitroglycerin Other (comments)     Could not tolerate long-acting nitroglycerin due to her migraine headaches.  Toprol Xl [Metoprolol Succinate] Other (comments)     Could not tolerate b/c of her migraine headaches        Current Medications:  Current Outpatient Medications   Medication Sig Dispense Refill    butalbital-aspirin-caffeine (FIORINAL) -40 mg tablet Take 1 Tab by mouth.  clopidogreL (PLAVIX) 75 mg tab Take 1 Tab by mouth daily. 90 Tab 3    rosuvastatin (CRESTOR) 40 mg tablet TAKE 1 TABLET BY MOUTH EVERY NIGHT 90 Tab 3    nitroglycerin (NITROSTAT) 0.4 mg SL tablet 1 Tab by SubLINGual route every five (5) minutes as needed for Chest Pain. Up to 3 doses. 1 Tab 0    aspirin delayed-release 81 mg tablet Take 1 Tab by mouth daily. 1 Tab 0    levothyroxine (SYNTHROID) 75 mcg tablet Take 75 mcg by mouth Daily (before breakfast).  zolpidem (AMBIEN) 10 mg tablet Take 10 mg by mouth nightly as needed.  lisinopril (PRINIVIL, ZESTRIL) 10 mg tablet Take 10 mg by mouth daily.  metoprolol succinate (TOPROL-XL) 50 mg XL tablet Take 1 Tab by mouth daily. 30 Tab 6       Review of systems:  Review of Systems   Constitutional: Negative for malaise/fatigue.    Respiratory: Negative for shortness of breath. Cardiovascular: Positive for chest pain. Negative for palpitations, orthopnea, claudication, leg swelling and PND. Gastrointestinal: Negative for blood in stool. Musculoskeletal: Negative for falls and myalgias. Wt Readings from Last 3 Encounters:   03/12/21 76.2 kg (168 lb)   03/02/21 78.5 kg (173 lb)   06/01/20 75.8 kg (167 lb)     BP Readings from Last 3 Encounters:   03/12/21 138/82   03/02/21 (!) 200/110   06/01/20 142/82     Pulse Readings from Last 3 Encounters:   03/12/21 83   03/02/21 86   06/01/20 75     01/14/20   ECHO ADULT COMPLETE 01/15/2020 1/15/2020    Narrative · Normal cavity size and wall thickness. Moderate systolic dysfunction. Estimated left ventricular ejection fraction is 35 - 40%. Visually   measured ejection fraction. Abnormal left ventricular wall motion. Age-appropriate left ventricular diastolic function. Signed by: John Armenta MD       EKG:  3.12.21: No Ekg performed today. Physical Exam:  Physical Exam  Constitutional:       Appearance: Normal appearance. HENT:      Head: Normocephalic and atraumatic. Eyes:      Extraocular Movements: Extraocular movements intact. Pupils: Pupils are equal, round, and reactive to light. Neck:      Musculoskeletal: Normal range of motion and neck supple. Cardiovascular:      Rate and Rhythm: Normal rate and regular rhythm. Heart sounds: No murmur. No friction rub. No gallop. Pulmonary:      Effort: Pulmonary effort is normal.      Breath sounds: Normal breath sounds. No wheezing, rhonchi or rales. Chest:      Chest wall: No tenderness. Abdominal:      General: Bowel sounds are normal.   Musculoskeletal: Normal range of motion. Right lower leg: No edema. Left lower leg: No edema. Skin:     General: Skin is warm and dry. Neurological:      Mental Status: She is alert and oriented to person, place, and time.           Labs  Lab Results   Component Value Date/Time    WBC 8.0 01/16/2020 05:30 AM    HGB 12.3 01/16/2020 05:30 AM    HCT 37.0 01/16/2020 05:30 AM    HEMATOCRIT 31.5 09/10/2010 04:40 AM    PLATELET 170 87/48/0219 05:30 AM    MCV 88.9 01/16/2020 05:30 AM     Lab Results   Component Value Date/Time    Sodium 143 01/16/2020 05:30 AM    Potassium 4.3 01/16/2020 05:30 AM    Chloride 111 01/16/2020 05:30 AM    CO2 27 01/16/2020 05:30 AM    Anion gap 5 01/16/2020 05:30 AM    Glucose 106 (H) 01/16/2020 05:30 AM    BUN 11 01/16/2020 05:30 AM    Creatinine 0.72 01/16/2020 05:30 AM    BUN/Creatinine ratio 15 01/16/2020 05:30 AM    GFR est AA >60 01/16/2020 05:30 AM    GFR est non-AA >60 01/16/2020 05:30 AM    Calcium 9.1 01/16/2020 05:30 AM     Lab Results   Component Value Date/Time    Cholesterol, total 146 10/06/2011 01:15 AM    Cholesterol, total 265 (H) 09/09/2010 02:00 AM    HDL Cholesterol 64 (H) 10/06/2011 01:15 AM    HDL Cholesterol 63 (H) 09/09/2010 02:00 AM    LDL, calculated 58.4 10/06/2011 01:15 AM    LDL, calculated 185.8 (H) 09/09/2010 02:00 AM    Triglyceride 118 10/06/2011 01:15 AM    Triglyceride 81 09/09/2010 02:00 AM    CHOL/HDL Ratio 2.3 10/06/2011 01:15 AM    CHOL/HDL Ratio 4.2 09/09/2010 02:00 AM      Lab Results   Component Value Date/Time    NT pro- 01/11/2020 11:57 AM        Impression/Plan:    1. Coronary artery disease  - Continue aspirin, plavix and statin. - Unable to tolerate beta-blocker due to migraines. 2. Hypertension  - Bp well controlled on exam, 130s/80s.  - Continue lisinopril 10mg daily.   - I have asked patient to monitor her bp for an additional 2 weeks and report readings. 3. Dyslipidemia  - Managed by primary care physician. - LDL should be less than 70 from a cardiac standpoint.  - Continue statin. Bp well controlled on exam however I have asked patient to monitor her bp for an additional 2 weeks and report readings to be certain bps are controlled.  Patient is scheduled for a nuclear stress test next week Friday, so she states she will bring a weeks worth of bp logs at that time. If her bp is consistently greater than 140s, will consider increasing lisinopril. Follow up with Dr. Khris Pringle as scheduled. Patient encouraged to follow up sooner if symptoms worsen or fail to improve. Thank you for allowing me to participate in the care of your patient. Please do not hesitate to call with questions or concerns.      Mekhi Messina NP

## 2021-03-16 ENCOUNTER — TELEPHONE (OUTPATIENT)
Dept: CARDIOLOGY CLINIC | Age: 61
End: 2021-03-16

## 2021-03-19 ENCOUNTER — TELEPHONE (OUTPATIENT)
Dept: CARDIOLOGY CLINIC | Age: 61
End: 2021-03-19

## 2021-03-19 LAB
STRESS BASELINE DIAS BP: 70 MMHG
STRESS BASELINE HR: 73 BPM
STRESS BASELINE SYS BP: 130 MMHG
STRESS PEAK DIAS BP: 60 MMHG
STRESS PEAK SYS BP: 136 MMHG
STRESS PERCENT HR ACHIEVED: 62 %
STRESS POST PEAK HR: 100 BPM
STRESS RATE PRESSURE PRODUCT: NORMAL BPM*MMHG
STRESS ST DEPRESSION: 0 MM
STRESS ST ELEVATION: 0 MM
STRESS STAGE 1 BP: NORMAL MMHG
STRESS STAGE 1 DURATION: 3 MIN:SEC
STRESS STAGE 1 HR: 100 BPM
STRESS STAGE RECOVERY 1 BP: NORMAL MMHG
STRESS STAGE RECOVERY 1 DURATION: 1 MIN:SEC
STRESS STAGE RECOVERY 1 HR: 84 BPM
STRESS TARGET HR: 161 BPM

## 2021-03-19 NOTE — TELEPHONE ENCOUNTER
March 19, 2021  9:45AM    Patient in office for stress test. Patient was last seen in the office by me 2 weeks ago for bp check. I asked if she would monitor her bp for an additional 2 weeks to be certain current dose of lisinopril controlled her bp. Bp well controlled in the 1-teens-130s/60s-70s per readings. Spoke with patient personally re: bps. Given bps are well controlled, instructed patient to continue lisinopril 10mg. Patient verbalized understanding.      Elo Shah, NP

## 2021-03-22 NOTE — PROGRESS NOTES
Per your last note\"    Ms. Josef Caraballo has history of CAD. She had initial anterolateral MI back in 2001 with stents. She had repeat distal LAD stent performed back in 2008 when she presented with anterior non-STEMI. Because of persistent chest pains, she underwent repeat coronary angiography in 2010 which revealed ostial ramus 90% stenosis which has done well on medical therapy. She also has dyslipidemia and hypertension. With careful dieting, she is trying to maintain her weight at about 155 pounds.     · CAD:   Clinically stable. · BP:    Elevated. Will initiate Toprol-XL 50 mg daily. If she cannot tolerate this due to migraine headaches, will initiate Imdur 60 mg daily. She will come back and see my nurse practitioner in 1-2 weeks for further blood pressure check. · Rhythm:    Stable sinus rhythm. · CHF:    There is no evidence of decompensated CHF noted. Her weight today is 173 pounds. She has put on another 10 pounds since her last visit. Her baseline weight is 155 pounds. With this was back in 2019. · Weight:      · Cholesterol:  Target LDL<70. Crestor 40.   · Anticoagulant:  Remains on ASA, and Plavix

## 2021-04-01 ENCOUNTER — TELEPHONE (OUTPATIENT)
Dept: CARDIOLOGY CLINIC | Age: 61
End: 2021-04-01

## 2021-04-01 NOTE — TELEPHONE ENCOUNTER
----- Message from [id number] Francis Arndt MD sent at 3/31/2021 10:30 AM EDT -----  Let the patient know that her nuclear scan shows normal LV function without significant reversible defect.  ----- Message -----  From: Niru Ken LPN  Sent: [id number]   8:23 AM EDT  To: [id number] Francis Arndt MD    Per your last note\"    Ms. Maggie Conde has history of CAD. She had initial anterolateral MI back in 2001 with stents. She had repeat distal LAD stent performed back in 2008 when she presented with anterior non-STEMI. Because of persistent chest pains, she underwent repeat coronary angiography in 2010 which revealed ostial ramus 90% stenosis which has done well on medical therapy. She also has dyslipidemia and hypertension. With careful dieting, she is trying to maintain her weight at about 155 pounds.     · CAD:   Clinically stable. · BP:    Elevated. Will initiate Toprol-XL 50 mg daily. If she cannot tolerate this due to migraine headaches, will initiate Imdur 60 mg daily. She will come back and see my nurse practitioner in 1-2 weeks for further blood pressure check. · Rhythm:    Stable sinus rhythm. · CHF:    There is no evidence of decompensated CHF noted. Her weight today is 173 pounds. She has put on another 10 pounds since her last visit. Her baseline weight is 155 pounds. With this was back in 2019. · Weight:      · Cholesterol:  Target LDL<70. Crestor 40.   · Anticoagulant:  Remains on ASA, and Plavix

## 2021-07-27 RX ORDER — ROSUVASTATIN CALCIUM 40 MG/1
TABLET, COATED ORAL
Qty: 90 TABLET | Refills: 3 | Status: SHIPPED | OUTPATIENT
Start: 2021-07-27 | End: 2022-08-02

## 2021-12-02 DIAGNOSIS — E78.5 DYSLIPIDEMIA: ICD-10-CM

## 2021-12-03 RX ORDER — CLOPIDOGREL BISULFATE 75 MG/1
TABLET ORAL
Qty: 90 TABLET | Refills: 3 | Status: SHIPPED | OUTPATIENT
Start: 2021-12-03

## 2022-03-09 ENCOUNTER — OFFICE VISIT (OUTPATIENT)
Dept: CARDIOLOGY CLINIC | Age: 62
End: 2022-03-09
Payer: MEDICARE

## 2022-03-09 VITALS
OXYGEN SATURATION: 97 % | DIASTOLIC BLOOD PRESSURE: 74 MMHG | SYSTOLIC BLOOD PRESSURE: 126 MMHG | BODY MASS INDEX: 30.3 KG/M2 | HEIGHT: 63 IN | HEART RATE: 77 BPM | WEIGHT: 171 LBS

## 2022-03-09 DIAGNOSIS — E78.5 DYSLIPIDEMIA: ICD-10-CM

## 2022-03-09 DIAGNOSIS — I25.10 CORONARY ARTERY DISEASE INVOLVING NATIVE CORONARY ARTERY OF NATIVE HEART WITHOUT ANGINA PECTORIS: ICD-10-CM

## 2022-03-09 DIAGNOSIS — R07.9 CHEST PAIN, UNSPECIFIED TYPE: Primary | ICD-10-CM

## 2022-03-09 DIAGNOSIS — I10 ESSENTIAL HYPERTENSION, BENIGN: ICD-10-CM

## 2022-03-09 PROBLEM — E03.4 HYPOTHYROIDISM DUE TO ACQUIRED ATROPHY OF THYROID: Status: ACTIVE | Noted: 2022-03-09

## 2022-03-09 PROBLEM — K64.9 BLEEDING HEMORRHOIDS: Status: ACTIVE | Noted: 2017-07-03

## 2022-03-09 PROBLEM — R63.5 WEIGHT GAIN: Status: ACTIVE | Noted: 2022-03-09

## 2022-03-09 PROBLEM — B00.9 HERPES SIMPLEX: Status: ACTIVE | Noted: 2022-03-09

## 2022-03-09 PROBLEM — G43.011 INTRACTABLE MIGRAINE WITHOUT AURA AND WITH STATUS MIGRAINOSUS: Status: ACTIVE | Noted: 2022-03-09

## 2022-03-09 PROBLEM — E78.2 MIXED HYPERLIPIDEMIA: Status: ACTIVE | Noted: 2022-03-09

## 2022-03-09 PROBLEM — F11.20 OPIOID TYPE DEPENDENCE, CONTINUOUS USE (HCC): Status: ACTIVE | Noted: 2021-12-14

## 2022-03-09 PROBLEM — K63.5 COLON POLYPS: Status: ACTIVE | Noted: 2022-03-09

## 2022-03-09 PROCEDURE — G8754 DIAS BP LESS 90: HCPCS | Performed by: INTERNAL MEDICINE

## 2022-03-09 PROCEDURE — 93000 ELECTROCARDIOGRAM COMPLETE: CPT | Performed by: INTERNAL MEDICINE

## 2022-03-09 PROCEDURE — G9717 DOC PT DX DEP/BP F/U NT REQ: HCPCS | Performed by: INTERNAL MEDICINE

## 2022-03-09 PROCEDURE — 99214 OFFICE O/P EST MOD 30 MIN: CPT | Performed by: INTERNAL MEDICINE

## 2022-03-09 PROCEDURE — 3017F COLORECTAL CA SCREEN DOC REV: CPT | Performed by: INTERNAL MEDICINE

## 2022-03-09 PROCEDURE — G8427 DOCREV CUR MEDS BY ELIG CLIN: HCPCS | Performed by: INTERNAL MEDICINE

## 2022-03-09 PROCEDURE — G9899 SCRN MAM PERF RSLTS DOC: HCPCS | Performed by: INTERNAL MEDICINE

## 2022-03-09 PROCEDURE — G8752 SYS BP LESS 140: HCPCS | Performed by: INTERNAL MEDICINE

## 2022-03-09 PROCEDURE — G8417 CALC BMI ABV UP PARAM F/U: HCPCS | Performed by: INTERNAL MEDICINE

## 2022-03-09 RX ORDER — NITROGLYCERIN 0.4 MG/1
0.4 TABLET SUBLINGUAL
Qty: 25 TABLET | Refills: 4 | Status: SHIPPED | OUTPATIENT
Start: 2022-03-09

## 2022-03-09 NOTE — PROGRESS NOTES
Jmail Oglesby presents today for   Chief Complaint   Patient presents with    Follow-up     6 months - no cardiac complaints        Jamil Oglesby preferred language for health care discussion is english/other. Is someone accompanying this pt?      Is the patient using any DME equipment during [address]? no    Depression Screening:  3 most recent PHQ Screens 3/9/2022   Little interest or pleasure in doing things Not at all   Feeling down, depressed, irritable, or hopeless Not at all   Total Score PHQ 2 0       Learning Assessment:  Learning Assessment 3/12/2021   PRIMARY LEARNER Patient   HIGHEST LEVEL OF EDUCATION - PRIMARY LEARNER  -   BARRIERS PRIMARY LEARNER -   CO-LEARNER CAREGIVER -   PRIMARY LANGUAGE ENGLISH   LEARNER PREFERENCE PRIMARY DEMONSTRATION   ANSWERED BY patient   RELATIONSHIP SELF       Abuse Screening:  Abuse Screening Questionnaire 3/12/2021   Do you ever feel afraid of your partner? N   Are you in a relationship with someone who physically or mentally threatens you? N   Is it safe for you to go home? Y       Fall Risk  Fall Risk Assessment, last 12 mths 3/12/2021   Able to walk? Yes   Fall in past 12 months? 0   Do you feel unsteady? 0   Are you worried about falling 0       Pt currently taking Anticoagulant therapy? ASA 81mg every day and Plavix     Coordination of Care:  1. Have you been to the ER, urgent care clinic since your last visit? Hospitalized since your last visit? no    2. Have you seen or consulted any other health care providers outside of the [address] [place] [address] since your last visit? Include any pap smears or colon screening.  no

## 2022-03-09 NOTE — PROGRESS NOTES
HISTORY OF PRESENT ILLNESS  Ole Wagner is a 62 y.o. female. ASSESSMENT and PLAN    Ms. Tracy Chavez has history of CAD. She had initial anterolateral MI back in 2001 with stents. She had repeat distal LAD stent performed back in 2008 when she presented with anterior non-STEMI. Because of persistent chest pains, she underwent repeat coronary angiography in 2010 which revealed ostial ramus 90% stenosis which has done well on medical therapy. She also has dyslipidemia and hypertension. With careful dieting, she is trying to maintain her weight at about 155 pounds. In January 2020, because of persistent chest pains, she underwent repeat coronary angiography. This revealed patent right dominant system with LAD ISR but less than 50% with normal JONAS-3 flow. Continued medical therapy was recommended. · CAD:    She has progressive chest pains. However, these are not always exertional related. Nevertheless, with her known history of aggressive CAD, will proceed with repeat exercise nuclear scan. I would have low threshold to proceed with repeat coronary angiography. This was discussed at length with the patient and her . All questions were answered. They agree with the plan. · BP:    Well controlled at 126/74. · Rhythm:    Stable sinus rhythm at 77 bpm.  · CHF:    There is no evidence of decompensated CHF noted. · Weight:     Her weight today is 171 pounds. Her target weight is 155 pounds. That was her baseline weight back in 2019. · Cholesterol:   Target LDL <70. Crestor 40. · Anticoagulant:  Remains on ASA, and Plavix.       I will see her back in 6 months. Thank you. Encounter Diagnoses   Name Primary?     Chest pain, unspecified type Yes    Dyslipidemia     Essential hypertension, benign     Coronary artery disease involving native coronary artery of native heart without angina pectoris      current treatment plan is effective, no change in therapy  lab results and schedule of future lab studies reviewed with patient  reviewed diet, exercise and weight control  cardiovascular risk and specific lipid/LDL goals reviewed  use of aspirin to prevent MI and TIA's discussed      HPI   Today, Ms. Worley complains of chest pains. These episodes occur with exertion but can also occur at rest.  She has had chest pains in the past but feels that this may be a little more frequent. Neither the duration noted intensity has worsened. She denies any consistent exertional chest pains. She denies worsening ZENG or exercise capacity. She denies any orthopnea or PND. She denies any palpitations or dizziness. She was accompanied by her . He agrees with with her reporting. Review of Systems   Respiratory: Negative for shortness of breath. Cardiovascular: Positive for chest pain. Negative for palpitations, orthopnea, claudication, leg swelling and PND. All other systems reviewed and are negative. Physical Exam  Vitals and nursing note reviewed. Constitutional:       Appearance: She is obese. HENT:      Head: Normocephalic. Eyes:      Conjunctiva/sclera: Conjunctivae normal.   Neck:      Vascular: No carotid bruit. Cardiovascular:      Rate and Rhythm: Normal rate and regular rhythm. Pulmonary:      Breath sounds: Normal breath sounds. Abdominal:      Palpations: Abdomen is soft. Musculoskeletal:         General: No swelling. Cervical back: No rigidity. Skin:     General: Skin is warm and dry. Neurological:      General: No focal deficit present. Mental Status: She is alert and oriented to person, place, and time. Psychiatric:         Mood and Affect: Mood normal.         Behavior: Behavior normal.         PCP: Prince Thornton MD    Past Medical History:   Diagnosis Date    Abnormal nuclear cardiac imaging test 07/16/2014    Sm-mod apical infarction. No ischemia. EF 60%. Mod apical, apical septal hypk. Neg EKG on max EST. Ex time 6 min.       CAD (coronary artery disease), native coronary artery     Dyslipidemia     Echocardiogram 09/08/2010    EF 50-55%. Mild, diffuse hypk. Mild mid-apical , anterior, apical septal hypk.  Essential hypertension, benign     Headache(784.0)     migraine    History of tobacco use     none since 2001    Hypercholesterolemia     Lower extremity venous duplex 10/05/2011    No evidence of DVT or superficial thrombosis bilaterally.  MI (myocardial infarction) ([address] 2001; 3/2008    s/p anterior wall MI, 2001; non-ST elevation MI, 2008    Musculoskeletal pain     Other chest pain     S/P cardiac cath 09/10/2010    RI (sm) 90% ostial.  LAD 30% in-stent restenosis. Otherwise patent coronaries. LVEDP 19. EF 60%.  Thyroid disease        Past Surgical History:   Procedure Laterality Date    CARDIAC CATHETERIZATION  10/20/2016         CORONARY ARTERY ANGIOGRAM  10/20/2016         HX APPENDECTOMY      1999    HX CHOLECYSTECTOMY      2005    HX CORONARY STENT PLACEMENT  2001; 2003; 2008    HX GYN      complete hysterectomy 1996    LV ANGIOGRAPHY  10/20/2016         MD CARDIAC SURG PROCEDURE UNLIST      stent placement        Current Outpatient Medications   Medication Sig Dispense Refill    nitroglycerin (NITROSTAT) 0.4 mg SL tablet 1 Tablet by SubLINGual route every five (5) minutes as needed for Chest Pain. Up to 3 doses. 25 Tablet 4    clopidogreL (PLAVIX) 75 mg tab TAKE 1 TABLET BY MOUTH DAILY 90 Tablet 3    rosuvastatin (CRESTOR) 40 mg tablet TAKE 1 TABLET BY MOUTH EVERY NIGHT 90 Tablet 3    butalbital-aspirin-caffeine (FIORINAL) -40 mg tablet Take 1 Tab by mouth.  metoprolol succinate (TOPROL-XL) 50 mg XL tablet Take 1 Tab by mouth daily. 30 Tab 6    aspirin delayed-release 81 mg tablet Take 1 Tab by mouth daily. 1 Tab 0    levothyroxine (SYNTHROID) 75 mcg tablet Take 75 mcg by mouth Daily (before breakfast).  zolpidem (AMBIEN) 10 mg tablet Take 10 mg by mouth nightly as needed.  lisinopril (PRINIVIL, ZESTRIL) 10 mg tablet Take 10 mg by mouth daily. The patient has a family history of    Social History     Tobacco Use    Smoking status: Former Smoker     Packs/day: 1.00     Years: 10.00     Pack years: 10.00     Quit date: 10/5/2000     Years since quittin.4    Smokeless tobacco: Never Used   Substance Use Topics    Alcohol use:  Yes     Alcohol/week: 1.0 standard drink     Types: 1 Glasses of wine per week     Comment: occasional alcohol use    Drug use: No       Lab Results   Component Value Date/Time    Cholesterol, total 146 10/06/2011 01:15 AM    HDL Cholesterol 64 (H) 10/06/2011 01:15 AM    LDL, calculated 58.4 10/06/2011 01:15 AM    Triglyceride 118 10/06/2011 01:15 AM    CHOL/HDL Ratio 2.3 10/06/2011 01:15 AM        BP Readings from Last 3 Encounters:   22 126/74   21 138/82   21 (!) 200/110        Pulse Readings from Last 3 Encounters:   22 77   21 83   21 86       Wt Readings from Last 3 Encounters:   22 77.6 kg (171 lb)   21 76.2 kg (168 lb)   21 76.2 kg (168 lb)         EKG: unchanged from previous tracings, normal sinus rhythm, Q waves in leads V1 and V2.

## 2022-03-17 ENCOUNTER — TELEPHONE (OUTPATIENT)
Dept: CARDIOLOGY CLINIC | Age: 62
End: 2022-03-17

## 2022-03-18 PROBLEM — R63.5 WEIGHT GAIN: Status: ACTIVE | Noted: 2022-03-09

## 2022-03-19 PROBLEM — R07.89 ATYPICAL CHEST PAIN: Status: ACTIVE | Noted: 2020-01-15

## 2022-03-19 PROBLEM — E03.4 HYPOTHYROIDISM DUE TO ACQUIRED ATROPHY OF THYROID: Status: ACTIVE | Noted: 2022-03-09

## 2022-03-19 PROBLEM — K63.5 COLON POLYPS: Status: ACTIVE | Noted: 2022-03-09

## 2022-03-19 PROBLEM — K64.9 BLEEDING HEMORRHOIDS: Status: ACTIVE | Noted: 2017-07-03

## 2022-03-19 PROBLEM — E78.2 MIXED HYPERLIPIDEMIA: Status: ACTIVE | Noted: 2022-03-09

## 2022-03-19 PROBLEM — F11.20 OPIOID TYPE DEPENDENCE, CONTINUOUS USE (HCC): Status: ACTIVE | Noted: 2021-12-14

## 2022-03-19 PROBLEM — B00.9 HERPES SIMPLEX: Status: ACTIVE | Noted: 2022-03-09

## 2022-03-19 PROBLEM — G43.011 INTRACTABLE MIGRAINE WITHOUT AURA AND WITH STATUS MIGRAINOSUS: Status: ACTIVE | Noted: 2022-03-09

## 2022-04-01 ENCOUNTER — TELEPHONE (OUTPATIENT)
Dept: CARDIOLOGY CLINIC | Age: 62
End: 2022-04-01

## 2022-04-01 NOTE — TELEPHONE ENCOUNTER
Pt had called 3/24 asking about her nuclear stress test results and whether there were any other tests/procedures planned. Told pt that Dr Marti Nguyen had not reviewed her stress test yet, but that nurse would review with him next time in office and call pt back. Verbal order and read back per Cash Lainez MD     No new changes on nuclear stress test  No follow up procedures needed at this time. Called pt back today, told her the above. Pt had no further questions. Content with call.

## 2022-08-02 RX ORDER — ROSUVASTATIN CALCIUM 40 MG/1
TABLET, COATED ORAL
Qty: 90 TABLET | Refills: 3 | Status: SHIPPED | OUTPATIENT
Start: 2022-08-02

## 2022-11-25 DIAGNOSIS — E78.5 DYSLIPIDEMIA: ICD-10-CM

## 2022-11-29 RX ORDER — CLOPIDOGREL BISULFATE 75 MG/1
TABLET ORAL
Qty: 90 TABLET | Refills: 3 | Status: SHIPPED | OUTPATIENT
Start: 2022-11-29

## 2022-12-07 ENCOUNTER — HOSPITAL ENCOUNTER (OUTPATIENT)
Dept: GENERAL RADIOLOGY | Age: 62
Discharge: HOME | End: 2022-12-07
Payer: MEDICARE

## 2022-12-07 ENCOUNTER — HOSPITAL ENCOUNTER (OUTPATIENT)
Dept: LAB | Age: 62
Discharge: HOME | End: 2022-12-07
Payer: MEDICARE

## 2022-12-07 ENCOUNTER — OFFICE VISIT (OUTPATIENT)
Dept: CARDIOLOGY CLINIC | Age: 62
End: 2022-12-07
Payer: MEDICARE

## 2022-12-07 VITALS
WEIGHT: 162 LBS | HEIGHT: 63 IN | BODY MASS INDEX: 28.7 KG/M2 | OXYGEN SATURATION: 99 % | SYSTOLIC BLOOD PRESSURE: 164 MMHG | DIASTOLIC BLOOD PRESSURE: 98 MMHG | HEART RATE: 94 BPM

## 2022-12-07 DIAGNOSIS — R07.9 CHEST PAIN, UNSPECIFIED TYPE: Primary | ICD-10-CM

## 2022-12-07 DIAGNOSIS — I25.10 CORONARY ARTERY DISEASE INVOLVING NATIVE CORONARY ARTERY OF NATIVE HEART WITHOUT ANGINA PECTORIS: ICD-10-CM

## 2022-12-07 DIAGNOSIS — R07.9 CHEST PAIN, UNSPECIFIED TYPE: ICD-10-CM

## 2022-12-07 DIAGNOSIS — I10 ESSENTIAL HYPERTENSION, BENIGN: ICD-10-CM

## 2022-12-07 LAB
ANION GAP SERPL CALC-SCNC: 6 MMOL/L (ref 3–18)
BASOPHILS # BLD: 0.1 K/UL (ref 0–0.1)
BASOPHILS NFR BLD: 1 % (ref 0–2)
BUN SERPL-MCNC: 12 MG/DL (ref 7–18)
BUN/CREAT SERPL: 13 (ref 12–20)
CALCIUM SERPL-MCNC: 9.8 MG/DL (ref 8.5–10.1)
CHLORIDE SERPL-SCNC: 108 MMOL/L (ref 100–111)
CO2 SERPL-SCNC: 26 MMOL/L (ref 21–32)
CREAT SERPL-MCNC: 0.9 MG/DL (ref 0.6–1.3)
DIFFERENTIAL METHOD BLD: NORMAL
EOSINOPHIL # BLD: 0.1 K/UL (ref 0–0.4)
EOSINOPHIL NFR BLD: 1 % (ref 0–5)
ERYTHROCYTE [DISTWIDTH] IN BLOOD BY AUTOMATED COUNT: 13.3 % (ref 11.6–14.5)
GLUCOSE SERPL-MCNC: 85 MG/DL (ref 74–99)
HCT VFR BLD AUTO: 40 % (ref 35–45)
HGB BLD-MCNC: 13.2 G/DL (ref 12–16)
IMM GRANULOCYTES # BLD AUTO: 0 K/UL (ref 0–0.04)
IMM GRANULOCYTES NFR BLD AUTO: 0 % (ref 0–0.5)
INR PPP: 1 (ref 0.8–1.2)
LYMPHOCYTES # BLD: 2.5 K/UL (ref 0.9–3.6)
LYMPHOCYTES NFR BLD: 32 % (ref 21–52)
MCH RBC QN AUTO: 30 PG (ref 24–34)
MCHC RBC AUTO-ENTMCNC: 33 G/DL (ref 31–37)
MCV RBC AUTO: 90.9 FL (ref 78–100)
MONOCYTES # BLD: 0.7 K/UL (ref 0.05–1.2)
MONOCYTES NFR BLD: 8 % (ref 3–10)
NEUTS SEG # BLD: 4.6 K/UL (ref 1.8–8)
NEUTS SEG NFR BLD: 58 % (ref 40–73)
NRBC # BLD: 0 K/UL (ref 0–0.01)
NRBC BLD-RTO: 0 PER 100 WBC
PLATELET # BLD AUTO: 250 K/UL (ref 135–420)
PMV BLD AUTO: 10.7 FL (ref 9.2–11.8)
POTASSIUM SERPL-SCNC: 3.9 MMOL/L (ref 3.5–5.5)
PROTHROMBIN TIME: 13.1 SEC (ref 11.5–15.2)
RBC # BLD AUTO: 4.4 M/UL (ref 4.2–5.3)
SODIUM SERPL-SCNC: 140 MMOL/L (ref 136–145)
WBC # BLD AUTO: 8 K/UL (ref 4.6–13.2)

## 2022-12-07 PROCEDURE — 93000 ELECTROCARDIOGRAM COMPLETE: CPT | Performed by: INTERNAL MEDICINE

## 2022-12-07 PROCEDURE — 99214 OFFICE O/P EST MOD 30 MIN: CPT | Performed by: INTERNAL MEDICINE

## 2022-12-07 PROCEDURE — G8753 SYS BP > OR = 140: HCPCS | Performed by: INTERNAL MEDICINE

## 2022-12-07 PROCEDURE — 80048 BASIC METABOLIC PNL TOTAL CA: CPT

## 2022-12-07 PROCEDURE — 71046 X-RAY EXAM CHEST 2 VIEWS: CPT

## 2022-12-07 PROCEDURE — 85610 PROTHROMBIN TIME: CPT

## 2022-12-07 PROCEDURE — 3078F DIAST BP <80 MM HG: CPT | Performed by: INTERNAL MEDICINE

## 2022-12-07 PROCEDURE — G8417 CALC BMI ABV UP PARAM F/U: HCPCS | Performed by: INTERNAL MEDICINE

## 2022-12-07 PROCEDURE — 3017F COLORECTAL CA SCREEN DOC REV: CPT | Performed by: INTERNAL MEDICINE

## 2022-12-07 PROCEDURE — 85025 COMPLETE CBC W/AUTO DIFF WBC: CPT

## 2022-12-07 PROCEDURE — 36415 COLL VENOUS BLD VENIPUNCTURE: CPT

## 2022-12-07 PROCEDURE — 3074F SYST BP LT 130 MM HG: CPT | Performed by: INTERNAL MEDICINE

## 2022-12-07 PROCEDURE — G8427 DOCREV CUR MEDS BY ELIG CLIN: HCPCS | Performed by: INTERNAL MEDICINE

## 2022-12-07 PROCEDURE — G9717 DOC PT DX DEP/BP F/U NT REQ: HCPCS | Performed by: INTERNAL MEDICINE

## 2022-12-07 PROCEDURE — G9899 SCRN MAM PERF RSLTS DOC: HCPCS | Performed by: INTERNAL MEDICINE

## 2022-12-07 PROCEDURE — G8755 DIAS BP > OR = 90: HCPCS | Performed by: INTERNAL MEDICINE

## 2022-12-07 RX ORDER — LORAZEPAM 2 MG/ML
1 INJECTION INTRAMUSCULAR ONCE
Status: CANCELLED | OUTPATIENT
Start: 2022-12-07 | End: 2022-12-07

## 2022-12-07 RX ORDER — SODIUM CHLORIDE 0.9 % (FLUSH) 0.9 %
5-40 SYRINGE (ML) INJECTION EVERY 8 HOURS
Status: CANCELLED | OUTPATIENT
Start: 2022-12-07

## 2022-12-07 RX ORDER — SODIUM CHLORIDE 0.9 % (FLUSH) 0.9 %
5-40 SYRINGE (ML) INJECTION AS NEEDED
Status: CANCELLED | OUTPATIENT
Start: 2022-12-07

## 2022-12-07 RX ORDER — ISOSORBIDE MONONITRATE 30 MG/1
30 TABLET, EXTENDED RELEASE ORAL
Qty: 30 TABLET | Refills: 6 | Status: SHIPPED | OUTPATIENT
Start: 2022-12-07

## 2022-12-07 NOTE — LETTER
12/7/2022 2:13 PM  Fabi Guzman  [id number]  1960        Insurance:  [employer] # _____________________      Proc Date: Tosha Armenta 12/8                Proc Time:  10:30      Performing MD : Dr. Timothy Sullivan                                         Scheduled with:  Elena                PCP:Lavon                                               Date:12/7/2022          HP: 12/7      EKG: ______    Labs:______  CXR: _______  Orders:  12/7          Special Instructions:  _____________________________________________________  ______________________________________________________________________  ______________________________________________________________________          The materials enclosed with this facsimile transmission are private and confidential and are the property of the sender. If you are not the intended recipient, be advised that any unauthorized use, disclosure, copying, distribution, or the taking of any action in reliance on the contents of this telecopied information is strictly prohibited. If you have received this in error, please immediately notify the sender via telephone to arrange for return of the forwarded documentation.

## 2022-12-07 NOTE — PROGRESS NOTES
HISTORY OF PRESENT ILLNESS  Abraham Khanna is a 62 y.o. female. ASSESSMENT and PLAN    Ms. Lorene Juarez has history of CAD. She had initial anterolateral MI back in 2001 with stents. She had repeat distal LAD stent performed back in 2008 when she presented with anterior non-STEMI. Because of persistent chest pains, she underwent repeat coronary angiography in 2010 which revealed ostial ramus 90% stenosis which has done well on medical therapy. She also has dyslipidemia and hypertension. With careful dieting, she is trying to maintain her weight at about 155 pounds. In January 2020, because of persistent chest pains, she underwent repeat coronary angiography. This revealed patent right dominant system with LAD ISR but less than 50% with normal JONAS-3 flow. Continued medical therapy was recommended. Because of ongoing chest pains, she had nuclear scan done in March 2022. This revealed EF 61% with probably normal perfusion. Continue medical therapy was recommended. CAD:   She has worsening chest pains. It is almost constant. It is worse with physical exertion. There is really no point in repeating a nuclear scan at this point. I have recommended proceeding on with repeat coronary angiography. She will be initiated on Imdur 30 mg daily. BP:   Mildly elevated at 156/94. Hopefully, her Imdur will make this improved. Rhythm:   Sinus rhythm at 94 bpm.  CHF:    There is no evidence of decompensated CHF noted. Weight:    Her weight today is 162 pounds. Her baseline weight is around 70 pounds. Her target weight is 255 pounds. Cholesterol:   Target LDL <70. Crestor 40. Anti-platelet:   Remains on ASA. , and Plavix. She is scheduled for coronary angiography tomorrow. If she has worsening pains that are worrisome, she is to report to the emergency room. I will see her back in 6 months. Thank you. Encounter Diagnoses   Name Primary?     Chest pain, unspecified type Yes    Essential hypertension, benign     Coronary artery disease involving native coronary artery of native heart without angina pectoris      the following changes in treatment are made: See above  lab results and schedule of future lab studies reviewed with patient  reviewed diet, exercise and weight control  cardiovascular risk and specific lipid/LDL goals reviewed  use of aspirin to prevent MI and TIA's discussed    Follow-up  Associated symptoms include chest pain and shortness of breath. Chest Pain   Associated symptoms include shortness of breath. Pertinent negatives include no claudication, no orthopnea, no palpitations and no PND. Today, Ms. Angel Rubio has chest pains. She states that her chest pains have not improved since March 2022. She has noticed some worsening over the last several weeks. She is under tremendous emotional stress from family issues. Nonetheless, she states that the pains worsen with vacuuming or walking. She has worsening ZENG. She denies any orthopnea or PND. Review of Systems   Respiratory:  Positive for shortness of breath. Cardiovascular:  Positive for chest pain. Negative for palpitations, orthopnea, claudication, leg swelling and PND. All other systems reviewed and are negative. Physical Exam  Vitals and nursing note reviewed. Constitutional:       Appearance: Normal appearance. HENT:      Head: Normocephalic. Eyes:      Conjunctiva/sclera: Conjunctivae normal.   Neck:      Vascular: No carotid bruit. Cardiovascular:      Rate and Rhythm: Normal rate and regular rhythm. Pulmonary:      Breath sounds: Normal breath sounds. Abdominal:      Palpations: Abdomen is soft. Musculoskeletal:         General: No swelling. Cervical back: No rigidity. Skin:     General: Skin is warm and dry. Neurological:      General: No focal deficit present. Mental Status: She is alert and oriented to person, place, and time.    Psychiatric:         Mood and Affect: Mood normal.         Behavior: Behavior normal.       PCP: Linda Hebert MD    Past Medical History:   Diagnosis Date    Abnormal nuclear cardiac imaging test 07/16/2014    Sm-mod apical infarction. No ischemia. EF 60%. Mod apical, apical septal hypk. Neg EKG on max EST. Ex time 6 min.  CAD (coronary artery disease), native coronary artery     Dyslipidemia     Echocardiogram 09/08/2010    EF 50-55%. Mild, diffuse hypk. Mild mid-apical , anterior, apical septal hypk.  Essential hypertension, benign     Headache(784.0)     migraine    History of tobacco use     none since 2001    Hypercholesterolemia     Lower extremity venous duplex 10/05/2011    No evidence of DVT or superficial thrombosis bilaterally.  MI (myocardial infarction) ([facility] [address] 2001; 3/2008    s/p anterior wall MI, 2001; non-ST elevation MI, 2008    Musculoskeletal pain     Other chest pain     S/P cardiac cath 09/10/2010    RI (sm) 90% ostial.  LAD 30% in-stent restenosis. Otherwise patent coronaries. LVEDP 19. EF 60%.  Thyroid disease        Past Surgical History:   Procedure Laterality Date    CARDIAC CATHETERIZATION  10/20/2016         CORONARY ARTERY ANGIOGRAM  10/20/2016         HX APPENDECTOMY      1999    HX CHOLECYSTECTOMY      2005    HX CORONARY STENT PLACEMENT  2001; 2003; 2008    HX GYN      complete hysterectomy 1996    LV ANGIOGRAPHY  10/20/2016         [place] CARDIAC SURG PROCEDURE UNLIST      stent placement        Current Outpatient Medications   Medication Sig Dispense Refill    clopidogreL (PLAVIX) 75 mg tab TAKE 1 TABLET BY MOUTH DAILY 90 Tablet 3    rosuvastatin (CRESTOR) 40 mg tablet TAKE 1 TABLET BY MOUTH EVERY NIGHT 90 Tablet 3    nitroglycerin (NITROSTAT) 0.4 mg SL tablet 1 Tablet by SubLINGual route every five (5) minutes as needed for Chest Pain. Up to 3 doses. 25 Tablet 4    butalbital-aspirin-caffeine (FIORINAL) -40 mg tablet Take 1 Tab by mouth.       metoprolol succinate (TOPROL-XL) 50 mg XL tablet Take 1 Tab by mouth daily. 30 Tab 6    aspirin delayed-release 81 mg tablet Take 1 Tab by mouth daily. 1 Tab 0    levothyroxine (SYNTHROID) 75 mcg tablet Take 75 mcg by mouth Daily (before breakfast).  zolpidem (AMBIEN) 10 mg tablet Take 10 mg by mouth nightly as needed.  lisinopril (PRINIVIL, ZESTRIL) 10 mg tablet Take 10 mg by mouth daily. The patient has a family history of    Social History     Tobacco Use    Smoking status: Former     Packs/day: 1.00     Years: 10.00     Pack years: 10.00     Types: Cigarettes     Quit date: 10/5/2000     Years since quittin.1    Smokeless tobacco: Never   Substance Use Topics    Alcohol use:  Yes     Alcohol/week: 1.0 standard drink     Types: 1 Glasses of wine per week     Comment: occasional alcohol use    Drug use: No       Lab Results   Component Value Date/Time    Cholesterol, total 146 10/06/2011 01:15 AM    HDL Cholesterol 64 (H) 10/06/2011 01:15 AM    LDL, calculated 58.4 10/06/2011 01:15 AM    Triglyceride 118 10/06/2011 01:15 AM    CHOL/HDL Ratio 2.3 10/06/2011 01:15 AM        BP Readings from Last 3 Encounters:   22 (!) 164/98   22 (!) 164/90   22 126/74        Pulse Readings from Last 3 Encounters:   22 94   22 77   21 83       Wt Readings from Last 3 Encounters:   22 73.5 kg (162 lb)   22 77.6 kg (171 lb)   22 77.6 kg (171 lb)         EKG: unchanged from previous tracings, normal sinus rhythm, Q waves in leads III, V1 and V2.

## 2022-12-07 NOTE — PATIENT INSTRUCTIONS
[facility]  Catheterization Instructions  [address] [facility], [address]             You are scheduled to have a Left heart cath  on December 8th  at 10:30am.    Please check in at 09:00am .      [facility] to [facility] and park in the outpatient parking lot that is located around to the back of the hospital and enter through the [facility] building. Once you enter through the [facility] check in with the  there. The  will either give you directions or assist you in getting to the cath holding area. You are not to eat or drink anything after midnight the night before your procedure. Small sips of water to take your medications is ok. If you are diabetic, do not take your insulin/sugar pill the morning of the procedure. MEDICATION INSTRUCTIONS:   Please take your morning medications with the following special instructions:           [x]          Please make sure to take your Blood pressure medication           [x]          Take your Aspirin,Plavix    We encourage families to wait in the waiting room on the first floor while the procedure is being done. The Doctor will come out and talk with you as soon as the procedure is over. You will need someone to drive you home after you have been discharged from the hospital.     There is the possibility that you may spend the night in the hospital, depending on the results of the procedure. This will be determined after the procedure is done. If angioplasty or stent is planned, you will stay at least one day. If you or your family have any questions, please call our office Monday -Friday, 8:30 a.m.-4:30 p.m.,  at [phone], and ask to speak to one of the nurses. Please have lab work and chest x-ray done today.   Follow up with Dr. Johanny Haddad in 1 month  Start Imdur 30mg daily

## 2022-12-07 NOTE — PROGRESS NOTES
Edward Crowe presents today for   Chief Complaint   Patient presents with    Follow-up     Overdue 6 month follow up       Chest Pain     Constant sharp pain on left side        Edward Crowe preferred language for health care discussion is english/other. Is someone accompanying this pt? Yes,      Is the patient using any DME equipment during [address]? no    Depression Screening:  3 most recent PHQ Screens 12/7/2022   Little interest or pleasure in doing things Not at all   Feeling down, depressed, irritable, or hopeless Not at all   Total Score PHQ 2 0       Learning Assessment:  Learning Assessment 3/12/2021   PRIMARY LEARNER Patient   HIGHEST LEVEL OF EDUCATION - PRIMARY LEARNER  -   BARRIERS PRIMARY LEARNER -   CO-LEARNER CAREGIVER -   PRIMARY LANGUAGE ENGLISH   LEARNER PREFERENCE PRIMARY DEMONSTRATION   ANSWERED BY patient   RELATIONSHIP SELF       Abuse Screening:  Abuse Screening Questionnaire 12/7/2022   Do you ever feel afraid of your partner? N   Are you in a relationship with someone who physically or mentally threatens you? N   Is it safe for you to go home? Y       Fall Risk  Fall Risk Assessment, last 12 mths 3/12/2021   Able to walk? Yes   Fall in past 12 months? 0   Do you feel unsteady? 0   Are you worried about falling 0       Pt currently taking Anticoagulant therapy? no    Coordination of Care:  1. Have you been to the ER, urgent care clinic since your last visit? Hospitalized since your last visit? no    2. Have you seen or consulted any other health care providers outside of the [address] [place] [address] since your last visit? Include any pap smears or colon screening.  no

## 2022-12-08 ENCOUNTER — HOSPITAL ENCOUNTER (OUTPATIENT)
Age: 62
Setting detail: OUTPATIENT SURGERY
Discharge: HOME | End: 2022-12-08
Attending: INTERNAL MEDICINE | Admitting: INTERNAL MEDICINE
Payer: MEDICARE

## 2022-12-08 VITALS
HEART RATE: 78 BPM | RESPIRATION RATE: 13 BRPM | DIASTOLIC BLOOD PRESSURE: 72 MMHG | HEIGHT: 64 IN | BODY MASS INDEX: 27.66 KG/M2 | OXYGEN SATURATION: 96 % | SYSTOLIC BLOOD PRESSURE: 118 MMHG | WEIGHT: 162 LBS

## 2022-12-08 DIAGNOSIS — I25.10 CORONARY ARTERY DISEASE, UNSPECIFIED VESSEL OR LESION TYPE, UNSPECIFIED WHETHER ANGINA PRESENT, UNSPECIFIED WHETHER NATIVE OR TRANSPLANTED HEART: ICD-10-CM

## 2022-12-08 DIAGNOSIS — M79.604 LEG PAIN, BILATERAL: ICD-10-CM

## 2022-12-08 DIAGNOSIS — M79.605 LEG PAIN, BILATERAL: ICD-10-CM

## 2022-12-08 DIAGNOSIS — R10.31 PAIN IN THE GROIN, RIGHT: Primary | ICD-10-CM

## 2022-12-08 PROCEDURE — C1894 INTRO/SHEATH, NON-LASER: HCPCS | Performed by: INTERNAL MEDICINE

## 2022-12-08 PROCEDURE — 74011000250 HC RX REV CODE- 250: Performed by: INTERNAL MEDICINE

## 2022-12-08 PROCEDURE — C1760 CLOSURE DEV, VASC: HCPCS | Performed by: INTERNAL MEDICINE

## 2022-12-08 PROCEDURE — 74011250636 HC RX REV CODE- 250/636: Performed by: INTERNAL MEDICINE

## 2022-12-08 PROCEDURE — 77030004558 HC CATH ANGI DX SUPR TORQ CARD -A: Performed by: INTERNAL MEDICINE

## 2022-12-08 PROCEDURE — 93458 L HRT ARTERY/VENTRICLE ANGIO: CPT | Performed by: INTERNAL MEDICINE

## 2022-12-08 PROCEDURE — 77030013797 HC KT TRNSDUC PRSSR EDWD -A: Performed by: INTERNAL MEDICINE

## 2022-12-08 PROCEDURE — 77030013744: Performed by: INTERNAL MEDICINE

## 2022-12-08 PROCEDURE — 74011250637 HC RX REV CODE- 250/637: Performed by: INTERNAL MEDICINE

## 2022-12-08 PROCEDURE — 93005 ELECTROCARDIOGRAM TRACING: CPT

## 2022-12-08 PROCEDURE — 99152 MOD SED SAME PHYS/QHP 5/>YRS: CPT | Performed by: INTERNAL MEDICINE

## 2022-12-08 RX ORDER — HEPARIN SODIUM 200 [USP'U]/100ML
INJECTION, SOLUTION INTRAVENOUS
Status: COMPLETED | OUTPATIENT
Start: 2022-12-08 | End: 2022-12-08

## 2022-12-08 RX ORDER — HYDROCODONE BITARTRATE AND ACETAMINOPHEN 5; 325 MG/1; MG/1
1 TABLET ORAL ONCE
Status: COMPLETED | OUTPATIENT
Start: 2022-12-08 | End: 2022-12-08

## 2022-12-08 RX ORDER — SODIUM CHLORIDE 0.9 % (FLUSH) 0.9 %
5-40 SYRINGE (ML) INJECTION EVERY 8 HOURS
Status: DISCONTINUED | OUTPATIENT
Start: 2022-12-08 | End: 2022-12-08 | Stop reason: HOSPADM

## 2022-12-08 RX ORDER — MIDAZOLAM HYDROCHLORIDE 1 MG/ML
INJECTION, SOLUTION INTRAMUSCULAR; INTRAVENOUS AS NEEDED
Status: DISCONTINUED | OUTPATIENT
Start: 2022-12-08 | End: 2022-12-08 | Stop reason: HOSPADM

## 2022-12-08 RX ORDER — SODIUM CHLORIDE 0.9 % (FLUSH) 0.9 %
5-40 SYRINGE (ML) INJECTION AS NEEDED
Status: DISCONTINUED | OUTPATIENT
Start: 2022-12-08 | End: 2022-12-08 | Stop reason: HOSPADM

## 2022-12-08 RX ORDER — RANOLAZINE 500 MG/1
500 TABLET, EXTENDED RELEASE ORAL 2 TIMES DAILY
Qty: 60 TABLET | Refills: 2 | Status: SHIPPED | OUTPATIENT
Start: 2022-12-08

## 2022-12-08 RX ORDER — FENTANYL CITRATE 50 UG/ML
INJECTION, SOLUTION INTRAMUSCULAR; INTRAVENOUS AS NEEDED
Status: DISCONTINUED | OUTPATIENT
Start: 2022-12-08 | End: 2022-12-08 | Stop reason: HOSPADM

## 2022-12-08 RX ORDER — LORAZEPAM 2 MG/ML
1 INJECTION INTRAMUSCULAR ONCE
Status: COMPLETED | OUTPATIENT
Start: 2022-12-08 | End: 2022-12-08

## 2022-12-08 RX ORDER — HYDROMORPHONE HYDROCHLORIDE 2 MG/1
1 TABLET ORAL
Qty: 4 TABLET | Refills: 0 | Status: SHIPPED | OUTPATIENT
Start: 2022-12-08 | End: 2022-12-08

## 2022-12-08 RX ORDER — HYDROMORPHONE HYDROCHLORIDE 2 MG/1
1 TABLET ORAL
Qty: 3 TABLET | Refills: 0 | Status: SHIPPED | OUTPATIENT
Start: 2022-12-08 | End: 2022-12-11

## 2022-12-08 RX ORDER — LIDOCAINE HYDROCHLORIDE 10 MG/ML
INJECTION, SOLUTION EPIDURAL; INFILTRATION; INTRACAUDAL; PERINEURAL AS NEEDED
Status: DISCONTINUED | OUTPATIENT
Start: 2022-12-08 | End: 2022-12-08 | Stop reason: HOSPADM

## 2022-12-08 RX ADMIN — HYDROCODONE BITARTRATE AND ACETAMINOPHEN 1 TABLET: 5; 325 TABLET ORAL at 15:30

## 2022-12-08 RX ADMIN — LORAZEPAM 1 MG: 2 INJECTION INTRAMUSCULAR; INTRAVENOUS at 11:04

## 2022-12-08 NOTE — ROUTINE PROCESS
CVT STEP DOWN -[id number]    On call Cardiologist was paged for this patient who called CVT ST trying to reach someone in the cath lab following an out patient procedure today. Nurse was unable to reach cath lab staff so a call was placed to on call Cardiology group to address an issue with prescribed pain meds. Nurse spoke with Gregory KOO and relayed the msg. A new pharmacy was provided so that an e-script may be called into a [facility] on [address], phone # [phone].

## 2022-12-08 NOTE — Clinical Note
TRANSFER - OUT REPORT:     [facility] [place] .     Report consisted of patient's Situation, Background, Assessment and   Recommendations(SBAR). Opportunity for questions and clarification was provided.

## 2022-12-08 NOTE — H&P
Addendum:    Patient with known history of CAD with recurrent, accelerating angina. We will proceed with coronary angiography and possible intervention if needed. All questions answered. She agrees with the plan. SANDRA  12/8/2022    HISTORY OF PRESENT ILLNESS  Heron Forrest is a 62 y.o. female. ASSESSMENT and PLAN     Ms. Andrea Cardenas has history of CAD. She had initial anterolateral MI back in 2001 with stents. She had repeat distal LAD stent performed back in 2008 when she presented with anterior non-STEMI. Because of persistent chest pains, she underwent repeat coronary angiography in 2010 which revealed ostial ramus 90% stenosis which has done well on medical therapy. She also has dyslipidemia and hypertension. With careful dieting, she is trying to maintain her weight at about 155 pounds. In January 2020, because of persistent chest pains, she underwent repeat coronary angiography. This revealed patent right dominant system with LAD ISR but less than 50% with normal JONAS-3 flow. Continued medical therapy was recommended. Because of ongoing chest pains, she had nuclear scan done in March 2022. This revealed EF 61% with probably normal perfusion. Continue medical therapy was recommended. CAD:   She has worsening chest pains. It is almost constant. It is worse with physical exertion. There is really no point in repeating a nuclear scan at this point. I have recommended proceeding on with repeat coronary angiography. She will be initiated on Imdur 30 mg daily. BP:   Mildly elevated at 156/94. Hopefully, her Imdur will make this improved. Rhythm:   Sinus rhythm at 94 bpm.  CHF:    There is no evidence of decompensated CHF noted. Weight:    Her weight today is 162 pounds. Her baseline weight is around 70 pounds. Her target weight is 255 pounds. Cholesterol:   Target LDL <70. Crestor 40. Anti-platelet:   Remains on ASA. , and Plavix.        She is scheduled for coronary angiography tomorrow. If she has worsening pains that are worrisome, she is to report to the emergency room. I will see her back in 6 months. Thank you. Encounter Diagnoses   Name Primary? Chest pain, unspecified type Yes    Essential hypertension, benign      Coronary artery disease involving native coronary artery of native heart without angina pectoris        the following changes in treatment are made: See above  lab results and schedule of future lab studies reviewed with patient  reviewed diet, exercise and weight control  cardiovascular risk and specific lipid/LDL goals reviewed  use of aspirin to prevent MI and TIA's discussed     Follow-up  Associated symptoms include chest pain and shortness of breath. Chest Pain   Associated symptoms include shortness of breath. Pertinent negatives include no claudication, no orthopnea, no palpitations and no PND. Today, Ms. Owen Parnell has chest pains. She states that her chest pains have not improved since March 2022. She has noticed some worsening over the last several weeks. She is under tremendous emotional stress from family issues. Nonetheless, she states that the pains worsen with vacuuming or walking. She has worsening ZENG. She denies any orthopnea or PND. Review of Systems   Respiratory:  Positive for shortness of breath. Cardiovascular:  Positive for chest pain. Negative for palpitations, orthopnea, claudication, leg swelling and PND. All other systems reviewed and are negative. Physical Exam  Vitals and nursing note reviewed. Constitutional:       Appearance: Normal appearance. HENT:      Head: Normocephalic. Eyes:      Conjunctiva/sclera: Conjunctivae normal.   Neck:      Vascular: No carotid bruit. Cardiovascular:      Rate and Rhythm: Normal rate and regular rhythm. Pulmonary:      Breath sounds: Normal breath sounds. Abdominal:      Palpations: Abdomen is soft. Musculoskeletal:         General: No swelling.       Cervical back: No rigidity. Skin:     General: Skin is warm and dry. Neurological:      General: No focal deficit present. Mental Status: She is alert and oriented to person, place, and time. Psychiatric:         Mood and Affect: Mood normal.         Behavior: Behavior normal.         PCP: Katherine Sahni MD          Past Medical History:   Diagnosis Date    Abnormal nuclear cardiac imaging test 07/16/2014     Sm-mod apical infarction. No ischemia. EF 60%. Mod apical, apical septal hypk. Neg EKG on max EST. Ex time 6 min. CAD (coronary artery disease), native coronary artery      Dyslipidemia      Echocardiogram 09/08/2010     EF 50-55%. Mild, diffuse hypk. Mild mid-apical , anterior, apical septal hypk. Essential hypertension, benign      Headache(784.0)       migraine    History of tobacco use       none since 2001    Hypercholesterolemia      Lower extremity venous duplex 10/05/2011     No evidence of DVT or superficial thrombosis bilaterally. MI (myocardial infarction) ([facility] [address] 2001; 3/2008     s/p anterior wall MI, 2001; non-ST elevation MI, 2008    Musculoskeletal pain      Other chest pain      S/P cardiac cath 09/10/2010     RI (sm) 90% ostial.  LAD 30% in-stent restenosis. Otherwise patent coronaries. LVEDP 19. EF 60%.       Thyroid disease                 Past Surgical History:   Procedure Laterality Date    CARDIAC CATHETERIZATION   10/20/2016          CORONARY ARTERY ANGIOGRAM   10/20/2016          HX APPENDECTOMY         1999    HX CHOLECYSTECTOMY         2005    HX CORONARY STENT PLACEMENT   2001; 2003; 2008    HX GYN         complete hysterectomy 1996    LV ANGIOGRAPHY   10/20/2016          [place] CARDIAC SURG PROCEDURE UNLIST         stent placement                 Current Outpatient Medications   Medication Sig Dispense Refill    clopidogreL (PLAVIX) 75 mg tab TAKE 1 TABLET BY MOUTH DAILY 90 Tablet 3    rosuvastatin (CRESTOR) 40 mg tablet TAKE 1 TABLET BY MOUTH EVERY NIGHT 90 Tablet 3    nitroglycerin (NITROSTAT) 0.4 mg SL tablet 1 Tablet by SubLINGual route every five (5) minutes as needed for Chest Pain. Up to 3 doses. 25 Tablet 4    butalbital-aspirin-caffeine (FIORINAL) -40 mg tablet Take 1 Tab by mouth.        metoprolol succinate (TOPROL-XL) 50 mg XL tablet Take 1 Tab by mouth daily. 30 Tab 6    aspirin delayed-release 81 mg tablet Take 1 Tab by mouth daily. 1 Tab 0    levothyroxine (SYNTHROID) 75 mcg tablet Take 75 mcg by mouth Daily (before breakfast). zolpidem (AMBIEN) 10 mg tablet Take 10 mg by mouth nightly as needed. lisinopril (PRINIVIL, ZESTRIL) 10 mg tablet Take 10 mg by mouth daily. The patient has a family history of     Social History            Tobacco Use    Smoking status: Former       Packs/day: 1.00       Years: 10.00       Pack years: 10.00       Types: Cigarettes       Quit date: 10/5/2000       Years since quittin.1    Smokeless tobacco: Never   Substance Use Topics    Alcohol use: Yes       Alcohol/week: 1.0 standard drink       Types: 1 Glasses of wine per week       Comment: occasional alcohol use    Drug use:  No               Lab Results   Component Value Date/Time     Cholesterol, total 146 10/06/2011 01:15 AM     HDL Cholesterol 64 (H) 10/06/2011 01:15 AM     LDL, calculated 58.4 10/06/2011 01:15 AM     Triglyceride 118 10/06/2011 01:15 AM     CHOL/HDL Ratio 2.3 10/06/2011 01:15 AM             BP Readings from Last 3 Encounters:   22 (!) 164/98   22 (!) 164/90   22 126/74             Pulse Readings from Last 3 Encounters:   22 94   22 77   21 83             Wt Readings from Last 3 Encounters:   22 73.5 kg (162 lb)   22 77.6 kg (171 lb)   22 77.6 kg (171 lb)            EKG: unchanged from previous tracings, normal sinus rhythm, Q waves in leads III, V1 and V2.

## 2022-12-08 NOTE — Clinical Note
TRANSFER - IN REPORT:     Verbal report received from: [address]. Report consisted of patient's Situation, Background, Assessment and   Recommendations(SBAR). Opportunity for questions and clarification was provided. Assessment completed upon patient's arrival to unit and care assumed. Patient transported with a Cardiac Cath Tech / Patient Care Tech.

## 2022-12-08 NOTE — DISCHARGE INSTRUCTIONS
HEART CATHETERIZATION/ANGIOGRAPHY DISCHARGE INSTRUCTIONS    Check puncture site frequently for swelling or bleeding. If there is any bleeding, lie down and apply pressure over the area with a clean towel or washcloth. Notify your doctor for any redness, swelling, drainage, or oozing from the puncture site. Notify your doctor for any fever or chills. If the extremity becomes cold, numb, or painful go to the Emergency Room. Activity should be limited for the next 48 hours. Climb stairs as little as possible and avoid any stooping, bending, or strenuous activity for 48 hours. No heavy lifting (anything over 8 pounds) for 5 days. You may resume your usual diet. Drink more fluids than usual.  Have a responsible person drive you home and stay with you for at least 24 hours after your heart catheterization/angiography. You may remove bandage from your {ARM/GROIN:14322} in 24 hours. You may shower in 24 hours. No tub baths, hot tubs, or swimming for 1 week. Do not place any lotions, creams, powders, or ointments over puncture site for 1 week. You may place a clean band-aid over the puncture site each day for 5 days. Change daily. If you take Metformin, do not take it for 48 hours. Ask your nurse when to restart any blood thinners. How can you care for yourself at home? Activity  Do not do strenuous exercise and do not lift, pull, or push anything heavy until your doctor says it is okay. This may be for a day or two. You can walk around the house and do light activity, such as cooking. You may shower 24 to 48 hours after the procedure, if your doctor okays it. Pat the incision dry. Do not take a bath for 1 week, or until your doctor tells you it is okay. If the catheter was placed in your groin, try not to walk up stairs for the first couple of days. If the catheter was placed in your arm near your wrist, do not bend your wrist deeply for the first couple of days.  Be careful using your hand to get into and out of a chair or bed. If your doctor recommends it, get more exercise. Walking is a good choice. Bit by bit, increase the amount you walk every day. Try for at least 30 minutes on most days of the week. Diet  Drink plenty of fluids to help your body flush out the dye. If you have kidney, heart, or liver disease and have to limit fluids, talk with your doctor before you increase the amount of fluids you drink. Keep eating a heart-healthy diet that has lots of fruits, vegetables, and whole grains. If you have not been eating this way, talk to your doctor. You also may want to talk to a dietitian. This expert can help you to learn about healthy foods and plan meals. Medicines  Your doctor will tell you if and when you can restart your medicines. He or she will also give you instructions about taking any new medicines. If you take blood thinners, such as warfarin (Coumadin), clopidogrel (Plavix), or aspirin, be sure to talk to your doctor. He or she will tell you if and when to start taking those medicines again. Make sure that you understand exactly what your doctor wants you to do. Your doctor may prescribe a blood-thinning medicine like aspirin or clopidogrel (Plavix). It is very important that you take these medicines exactly as directed in order to keep the coronary artery open and reduce your risk of a heart attack. Be safe with medicines. Call your doctor if you think you are having a problem with your medicine. Care of the catheter site  For the first 3 days, keep a bandage over the spot where the catheter was inserted. Put ice or a cold pack on the area for 10 to 20 minutes at a time to help with soreness or swelling. Put a thin cloth between the ice and your skin. Sedation for a Medical Procedure: Care Instructions  Your Care Instructions  For a minor procedure or surgery, you will get a sedative to help you relax. This drug will make you sleepy. It is usually given in a vein (by IV).  A shot may also be used to numb the area. If you had local anesthesia, you may feel some pain and discomfort as it wears off. If you have pain, don't be afraid to say so. Pain medicine works better if you take it before the pain gets bad. Common side effects from sedation include:  Feeling sleepy. (Your doctors and nurses will make sure you are not too sleepy to go home.)  Nausea and vomiting. This usually does not last long. Feeling tired. Follow-up care is a key part of your treatment and safety. Be sure to make and go to all appointments, and call your doctor if you are having problems. It's also a good idea to know your test results and keep a list of the medicines you take. How can you care for yourself at home? Activity  Don't do anything for 24 hours that requires attention to detail. It takes time for the medicine effects to completely wear off. For your safety, you should not drive or operate any machinery that could be dangerous until the medicine wears off and you can think clearly and react easily. Rest when you feel tired. Getting enough sleep will help you recover. Diet  You can eat your normal diet, unless your doctor gives you other instructions. If your stomach is upset, try clear liquids and bland, low-fat foods like plain toast or rice. Drink plenty of fluids (unless your doctor tells you not to). Don't drink alcohol for 24 hours. Medicines  Be safe with medicines. Read and follow all instructions on the label. If the doctor gave you a prescription medicine for pain, take it as prescribed. If you are not taking a prescription pain medicine, ask your doctor if you can take an over-the-counter medicine. If you think your pain medicine is making you sick to your stomach: Take your medicine after meals (unless your doctor has told you not to). Ask your doctor for a different pain medicine. Follow-up care is a key part of your treatment and safety.  Be sure to make and go to all appointments, and call your doctor if you are having problems. It's also a good idea to know your test results and keep a list of the medicines you take. When should you call for help? Call 911 anytime you think you may need emergency care. For example, call if:  You passed out (lost consciousness). You have severe trouble breathing. You have sudden chest pain and shortness of breath, or you cough up blood. You have symptoms of a heart attack. These may include:  Chest pain or pressure, or a strange feeling in the chest.  Sweating. Shortness of breath. Nausea or vomiting. Pain, pressure, or a strange feeling in the back, neck, jaw, or upper belly, or in one or both shoulders or arms. Lightheadedness or sudden weakness. A fast or irregular heartbeat. After you call 911, the  may tel you to chew 1 adult-strength or 2 to 4 low-dose aspirin. Wait for an ambulance. Do not try to drive yourself. You have been diagnosed with angina, and you have symptoms that do not go away with rest or are not getting better within 5 minutes after you take a dose of nitroglycerin. Call your doctor now or seek immediate medical care if:  You are bleeding from the area where the catheter was put in your artery. You have a fast-growing, painful lump at the catheter site. You have signs of infection, such as: Increased pain, swelling, warmth, or redness. Red streaks leading from the catheter site. Pus draining from the catheter site. A fever. Your leg or arm looks blue or feels cold, numb, or tingly. These are general instructions for a healthy lifestyle:    No smoking/ No tobacco products/ Avoid exposure to second hand smoke    Surgeon General's Warning:  Quitting smoking now greatly reduces serious risk to your health.     Obesity, smoking, and sedentary lifestyle greatly increases your risk for illness    A healthy diet, regular physical exercise & weight monitoring are important for maintaining a healthy lifestyle    You may be retaining fluid if you have a history of heart failure or if you experience any of the following symptoms:  Weight gain of 3 pounds or more overnight or 5 pounds in a week, increased swelling in our hands or feet or shortness of breath while lying flat in bed. Please call your doctor as soon as you notice any of these symptoms; do not wait until your next office visit. Recognize signs and symptoms of STROKE:    F-face looks uneven    A-arms unable to move or move unevenly    S-speech slurred or non-existent    T-time-call 911 as soon as signs and symptoms begin-DO NOT go       Back to bed or wait to see if you get better-TIME IS BRAIN. Warning Signs of HEART ATTACK     Call 911 if you have these symptoms:  Chest discomfort. Most heart attacks involve discomfort in the center of the chest that lasts more than a few minutes, or that goes away and comes back. It can feel like uncomfortable pressure, squeezing, fullness, or pain. Discomfort in other areas of the upper body. Symptoms can include pain or discomfort in one or both arms, the back, neck, jaw, or stomach. Shortness of breath with or without chest discomfort. Other signs may include breaking out in a cold sweat, nausea, or lightheadedness. Don't wait more than five minutes to call 911 - MINUTES MATTER! Fast action can save your life. Calling 911 is almost always the fastest way to get lifesaving treatment. Emergency Medical Services staff can begin treatment when they arrive -- up to an hour sooner than if someone gets to the hospital by car.

## 2022-12-08 NOTE — ROUTINE PROCESS
AVS Discharge instructions reviewed with patient and copy given to patient. All questions answered. Patient verbalized understanding to all discharge instructions. PIV X 2 removed. Procedural site within normal limits. No hematoma or bleeding noted from procedural and PIV site. No pain noted at discharge. Patient discharged with support person in stable condition. Escorted out to vehicle for transport home.

## 2022-12-09 ENCOUNTER — TELEPHONE (OUTPATIENT)
Dept: CARDIOLOGY CLINIC | Age: 62
End: 2022-12-09

## 2022-12-09 LAB
ATRIAL RATE: 80 BPM
CALCULATED P AXIS, ECG09: 73 DEGREES
CALCULATED R AXIS, ECG10: 5 DEGREES
CALCULATED T AXIS, ECG11: 64 DEGREES
DIAGNOSIS, 93000: NORMAL
P-R INTERVAL, ECG05: 136 MS
Q-T INTERVAL, ECG07: 416 MS
QRS DURATION, ECG06: 76 MS
QTC CALCULATION (BEZET), ECG08: 479 MS
VENTRICULAR RATE, ECG03: 80 BPM

## 2022-12-09 RX ORDER — DEXTROSE MONOHYDRATE AND SODIUM CHLORIDE 5; .45 G/100ML; G/100ML
150 INJECTION, SOLUTION INTRAVENOUS CONTINUOUS
OUTPATIENT
Start: 2022-12-09 | End: 2022-12-09

## 2022-12-09 RX ORDER — GUAIFENESIN 100 MG/5ML
162 LIQUID (ML) ORAL
OUTPATIENT
Start: 2022-12-09 | End: 2022-12-09

## 2022-12-09 NOTE — TELEPHONE ENCOUNTER
Patient called regarding her Ranexa being over $400. When she was here on 12/7/22 to see Dr. Martinez Guardado, she was prescribed Imdur 30mg daily. She hadn't started it yet. I explained to her to go ahead and start it per Dr. Martinez Guardado. I explained to keep an eye on her BP. I put a patient assistance application in the mail for her regarding her Ranexa. She was agreeable to starting her Imdur at nighttime. I told her to call this office on Monday or over the weekend if she was having any side effects from taking the Imdur due to her having migraines/headaches from using NTG when she needed to.

## 2023-05-24 ENCOUNTER — OFFICE VISIT (OUTPATIENT)
Age: 63
End: 2023-05-24
Payer: COMMERCIAL

## 2023-05-24 VITALS
HEIGHT: 64 IN | HEART RATE: 85 BPM | OXYGEN SATURATION: 100 % | BODY MASS INDEX: 27.49 KG/M2 | SYSTOLIC BLOOD PRESSURE: 136 MMHG | WEIGHT: 161 LBS | DIASTOLIC BLOOD PRESSURE: 84 MMHG

## 2023-05-24 DIAGNOSIS — R07.9 CHEST PAIN, UNSPECIFIED TYPE: Primary | ICD-10-CM

## 2023-05-24 DIAGNOSIS — I10 ESSENTIAL (PRIMARY) HYPERTENSION: ICD-10-CM

## 2023-05-24 DIAGNOSIS — I25.10 ATHEROSCLEROSIS OF NATIVE CORONARY ARTERY WITHOUT ANGINA PECTORIS, UNSPECIFIED WHETHER NATIVE OR TRANSPLANTED HEART: ICD-10-CM

## 2023-05-24 PROCEDURE — 3079F DIAST BP 80-89 MM HG: CPT | Performed by: INTERNAL MEDICINE

## 2023-05-24 PROCEDURE — 99214 OFFICE O/P EST MOD 30 MIN: CPT | Performed by: INTERNAL MEDICINE

## 2023-05-24 PROCEDURE — 93000 ELECTROCARDIOGRAM COMPLETE: CPT | Performed by: INTERNAL MEDICINE

## 2023-05-24 PROCEDURE — 3075F SYST BP GE 130 - 139MM HG: CPT | Performed by: INTERNAL MEDICINE

## 2023-05-24 ASSESSMENT — PATIENT HEALTH QUESTIONNAIRE - PHQ9
5. POOR APPETITE OR OVEREATING: 0
SUM OF ALL RESPONSES TO PHQ QUESTIONS 1-9: 0
3. TROUBLE FALLING OR STAYING ASLEEP: 0
10. IF YOU CHECKED OFF ANY PROBLEMS, HOW DIFFICULT HAVE THESE PROBLEMS MADE IT FOR YOU TO DO YOUR WORK, TAKE CARE OF THINGS AT HOME, OR GET ALONG WITH OTHER PEOPLE: 0
6. FEELING BAD ABOUT YOURSELF - OR THAT YOU ARE A FAILURE OR HAVE LET YOURSELF OR YOUR FAMILY DOWN: 0
4. FEELING TIRED OR HAVING LITTLE ENERGY: 0
1. LITTLE INTEREST OR PLEASURE IN DOING THINGS: 0
SUM OF ALL RESPONSES TO PHQ QUESTIONS 1-9: 0
2. FEELING DOWN, DEPRESSED OR HOPELESS: 0
9. THOUGHTS THAT YOU WOULD BE BETTER OFF DEAD, OR OF HURTING YOURSELF: 0
7. TROUBLE CONCENTRATING ON THINGS, SUCH AS READING THE NEWSPAPER OR WATCHING TELEVISION: 0
8. MOVING OR SPEAKING SO SLOWLY THAT OTHER PEOPLE COULD HAVE NOTICED. OR THE OPPOSITE, BEING SO FIGETY OR RESTLESS THAT YOU HAVE BEEN MOVING AROUND A LOT MORE THAN USUAL: 0
SUM OF ALL RESPONSES TO PHQ9 QUESTIONS 1 & 2: 0

## 2023-05-24 NOTE — PROGRESS NOTES
HISTORY OF PRESENT ILLNESS  Bird Del Rosario  65 y.o. female     Chief Complaint   Patient presents with    Follow-up     5 month f/u     Follow-Up from Hospital     F/u after coronary angiography and left heart cath 12/8/22    Shortness of Breath     SOB with exertion     Chest Pain     Left chest sharp/dull pains on/off       ASSESSMENT and PLAN    The primary encounter diagnosis was Chest pain, unspecified type. Diagnoses of Essential (primary) hypertension and Atherosclerosis of native coronary artery without angina pectoris, unspecified whether native or transplanted heart were also pertinent to this visit. Ms. Bird Del Rosario has history of CAD. She had initial anterolateral MI back in 2001 with stents. She had repeat distal LAD stent performed back in 2008 when she presented with anterior non-STEMI. Because of persistent chest pains, she underwent repeat coronary angiography in 2010 which revealed ostial ramus 90% stenosis which has done well on medical therapy. She also has dyslipidemia and hypertension. With careful dieting, she is trying to maintain her weight at about 155 pounds. In January 2020, because of persistent chest pains, she underwent repeat coronary angiography. This revealed patent right dominant system with LAD ISR but less than 50% with normal RIVERA-3 flow. Continued medical therapy was recommended. Because of ongoing chest pains, she had nuclear scan done in March 2022. This revealed EF 61% with probably normal perfusion. Continue medical therapy was recommended. CAD:    She has continued stable, exertional angina. She has not noted any progression. BP:    Well controlled at 136/84. Rhythm:    Stable sinus at 85 bpm.  CHF:    There is no evidence of decompensated CHF noted. Weight:     Her weight today is 161 pounds. Her baseline weight is 170 pounds. Her target weight is 155 pounds. Cholesterol:   Target LDL <70. Crestor 40. Anti-platelet:   Remains on ASA, and Plavix.

## 2023-05-24 NOTE — PROGRESS NOTES
Bebeto Breen presents today for   Chief Complaint   Patient presents with    Follow-up     5 month f/u     Follow-Up from Hospital     F/u after coronary angiography and left heart cath 12/8/22    Shortness of Breath     SOB with exertion     Chest Pain     Left chest sharp/dull pains on/off       Bebeto Breen preferred language for health care discussion is english/other. Is someone accompanying this pt? no    Is the patient using any DME equipment during OV? no    Depression Screening:  Depression: Not at risk    PHQ-2 Score: 0        Learning Assessment:  Who is the primary learner? Patient    What is the preferred language for health care of the primary learner? ENGLISH    How does the primary learner prefer to learn new concepts? DEMONSTRATION    Answered By patient    Relationship to Learner SELF           Pt currently taking Anticoagulant therapy? no    Pt currently taking Antiplatelet therapy ? ASA 81 mg 1x daily and Plavix 75 mg 1x daily       Coordination of Care:  1. Have you been to the ER, urgent care clinic since your last visit? Hospitalized since your last visit? yes    2. Have you seen or consulted any other health care providers outside of the [address] [place] [address] since your last visit? Include any pap smears or colon screening.  no

## 2023-06-29 RX ORDER — ISOSORBIDE MONONITRATE 30 MG/1
TABLET, EXTENDED RELEASE ORAL
Qty: 30 TABLET | Refills: 6 | Status: SHIPPED | OUTPATIENT
Start: 2023-06-29

## 2023-08-14 RX ORDER — ROSUVASTATIN CALCIUM 40 MG/1
TABLET, COATED ORAL
Qty: 90 TABLET | Refills: 3 | Status: SHIPPED | OUTPATIENT
Start: 2023-08-14

## 2023-08-21 NOTE — Clinical Note
Contrast: Isovue. Contrast concentration: 300. Amount: 60 mL. [Initial Visit ___] : [unfilled] is here today for an initial visit  for [unfilled]

## 2023-09-01 RX ORDER — CLOPIDOGREL BISULFATE 75 MG/1
TABLET ORAL
Qty: 90 TABLET | Refills: 3 | Status: SHIPPED | OUTPATIENT
Start: 2023-09-01

## 2024-08-07 ENCOUNTER — OFFICE VISIT (OUTPATIENT)
Age: 64
End: 2024-08-07
Payer: COMMERCIAL

## 2024-08-07 VITALS
OXYGEN SATURATION: 98 % | HEIGHT: 64 IN | HEART RATE: 81 BPM | SYSTOLIC BLOOD PRESSURE: 148 MMHG | DIASTOLIC BLOOD PRESSURE: 98 MMHG | BODY MASS INDEX: 28.17 KG/M2 | WEIGHT: 165 LBS

## 2024-08-07 DIAGNOSIS — I25.10 ATHEROSCLEROSIS OF NATIVE CORONARY ARTERY WITHOUT ANGINA PECTORIS, UNSPECIFIED WHETHER NATIVE OR TRANSPLANTED HEART: ICD-10-CM

## 2024-08-07 DIAGNOSIS — I10 ESSENTIAL (PRIMARY) HYPERTENSION: ICD-10-CM

## 2024-08-07 DIAGNOSIS — R07.9 CHEST PAIN, UNSPECIFIED TYPE: Primary | ICD-10-CM

## 2024-08-07 PROCEDURE — 99214 OFFICE O/P EST MOD 30 MIN: CPT | Performed by: INTERNAL MEDICINE

## 2024-08-07 PROCEDURE — 93000 ELECTROCARDIOGRAM COMPLETE: CPT | Performed by: INTERNAL MEDICINE

## 2024-08-07 PROCEDURE — 3077F SYST BP >= 140 MM HG: CPT | Performed by: INTERNAL MEDICINE

## 2024-08-07 PROCEDURE — 3079F DIAST BP 80-89 MM HG: CPT | Performed by: INTERNAL MEDICINE

## 2024-08-07 RX ORDER — ONDANSETRON 4 MG/1
4 TABLET, ORALLY DISINTEGRATING ORAL ONCE
COMMUNITY
Start: 2023-10-12

## 2024-08-07 NOTE — PROGRESS NOTES
Colette Justice presents today for   Chief Complaint   Patient presents with    Follow-up     6 months       Colette Justice preferred language for health care discussion is english/other.    Is someone accompanying this pt? yes    Is the patient using any DME equipment during OV? no    Depression Screening:  Depression: Not at risk (5/24/2023)    PHQ-2     PHQ-2 Score: 0        Learning Assessment:  No question data found.       Pt currently taking Anticoagulant therapy? no    Pt currently taking Antiplatelet therapy ? Aspirin 81mg daily      Coordination of Care:  1. Have you been to the ER, urgent care clinic since your last visit? Hospitalized since your last visit? no    2. Have you seen or consulted any other health care providers outside of the [facility] since your last visit? Include any pap smears or colon screening. no    
No ischemia.  EF 60%.  Mod apical, apical septal hypk.  Neg EKG on max EST.  Ex time 6 min.      CAD (coronary artery disease), native coronary artery     Dyslipidemia     Echocardiogram abnormal 09/08/2010    EF 50-55%.  Mild, diffuse hypk.  Mild mid-apical , anterior, apical septal hypk.      Essential hypertension, benign     Headache(784.0)     migraine    History of tobacco use     none since 2001    Hypercholesterolemia     MI (myocardial infarction) (HCC) 2001; 3/2008    s/p anterior wall MI, 2001; non-ST elevation MI, 2008    Musculoskeletal pain     Other chest pain     S/P cardiac cath 09/10/2010    RI (sm) 90% ostial.  LAD 30% in-stent restenosis.  Otherwise patent coronaries. LVEDP 19.  EF 60%.      Thyroid disease     Venous (peripheral) insufficiency 10/05/2011    No evidence of DVT or superficial thrombosis bilaterally.       Past Surgical History:   Procedure Laterality Date    APPENDECTOMY      1999    CARDIAC CATH PROCEDURE  10/20/2016         CHOLECYSTECTOMY      2005    CORONARY ANGIOPLASTY WITH STENT PLACEMENT  2001; 2003; 2008    CORONARY ARTERY ANGIOGRAM  10/20/2016         GYN      complete hysterectomy 1996    LV ANGIOGRAPHY  10/20/2016         [facility] UNLISTED PROCEDURE CARDIAC SURGERY      stent placement        Current Outpatient Medications   Medication Sig Dispense Refill    ondansetron (ZOFRAN-ODT) 4 MG disintegrating tablet Take 1 tablet by mouth once      clopidogrel (PLAVIX) 75 MG tablet TAKE 1 TABLET BY MOUTH DAILY 90 tablet 3    rosuvastatin (CRESTOR) 40 MG tablet TAKE 1 TABLET BY MOUTH EVERY NIGHT 90 tablet 3    isosorbide mononitrate (IMDUR) 30 MG extended release tablet TAKE 1 TABLET BY MOUTH EVERY MORNING 30 tablet 6    aspirin 81 MG EC tablet Take 1 tablet by mouth daily      butalbital-aspirin-caffeine (FIORINAL) -40 MG per tablet Take 1 tablet by mouth.      levothyroxine (SYNTHROID) 75 MCG tablet Take 1 tablet by mouth every morning (before breakfast)      lisinopril

## 2024-08-12 RX ORDER — ROSUVASTATIN CALCIUM 40 MG/1
TABLET, COATED ORAL
Qty: 90 TABLET | Refills: 3 | Status: SHIPPED | OUTPATIENT
Start: 2024-08-12

## 2025-01-27 ENCOUNTER — TELEPHONE (OUTPATIENT)
Age: 65
End: 2025-01-27

## 2025-01-27 NOTE — TELEPHONE ENCOUNTER
Fax received for cardiac clearance  from Radhames Silverio M.D.  Surgery on 1/31/2025 for Right distal radiolus open reduction internal fraction. Requesting to stop Plavix.

## 2025-01-28 NOTE — TELEPHONE ENCOUNTER
Faxed cardiac clearance to Radhames Silverio M.D  for wrist surgery (broken wrist). Per verbal order read back Dr. Supa Price Jr. Patient is at moderate risk. Stop Plavix ASAP. Usually stops Plavix 5-7 days prior. Spoke with patient to inform her she understood orders.

## 2025-05-20 ENCOUNTER — TELEPHONE (OUTPATIENT)
Age: 65
End: 2025-05-20

## 2025-05-20 NOTE — TELEPHONE ENCOUNTER
Faxed to [facility].  Verbal order and read back per Lee Childers MD  From a cardiac standpoint she is low to moderate risk for procedure. She Is to hold Plavix/Brilinta 5 days and continue Aspirin perioperatively.

## 2025-07-31 RX ORDER — ROSUVASTATIN CALCIUM 40 MG/1
40 TABLET, COATED ORAL NIGHTLY
Qty: 90 TABLET | Refills: 3 | Status: SHIPPED | OUTPATIENT
Start: 2025-07-31

## (undated) DEVICE — CATHETER DIAG AD L100CM DIA6FR STD JUDKINS L 4 POLYUR COR

## (undated) DEVICE — SET FLD ADMIN 3 W STPCOCK FIX FEM L BOR 1IN

## (undated) DEVICE — PROCEDURE KIT FLUID MGMT 10 FR CUST MAINFOLD

## (undated) DEVICE — INTRODUCER SHTH 6FR CANN L11CM DIL TIP 35MM GRN TUNGSTEN

## (undated) DEVICE — PACK PROCEDURE SURG VASC CATH 161 MMC LF

## (undated) DEVICE — CATHETER ANGIO JR4 STD 0.038 IN 6 FRX100 CM SUPER TORQUE +

## (undated) DEVICE — ANGIOGRAPHY KIT CUST VASC

## (undated) DEVICE — PERCLOSE PROGLIDE™ SUTURE-MEDIATED CLOSURE SYSTEM: Brand: PERCLOSE PROGLIDE™

## (undated) DEVICE — CATHETER ANGIO 6FR L110CM 145DEG VENT POLYUR PGTL 6 SIDE H

## (undated) DEVICE — PRESSURE MONITORING SET: Brand: TRUWAVE

## (undated) DEVICE — COVER US PRB W15XL120CM W/ GEL RUBBERBAND TAPE STRP FLD GEN

## (undated) DEVICE — CATHETER ANGIO JR3.5 AD 6 FRX100 CM ST SUPER TORQUE +